# Patient Record
Sex: FEMALE | Race: BLACK OR AFRICAN AMERICAN | NOT HISPANIC OR LATINO | Employment: FULL TIME | URBAN - METROPOLITAN AREA
[De-identification: names, ages, dates, MRNs, and addresses within clinical notes are randomized per-mention and may not be internally consistent; named-entity substitution may affect disease eponyms.]

---

## 2020-12-09 ENCOUNTER — TRANSFERRED RECORDS (OUTPATIENT)
Dept: MULTI SPECIALTY CLINIC | Facility: CLINIC | Age: 45
End: 2020-12-09

## 2020-12-09 LAB
HPV ABSTRACT: NORMAL
PAP-ABSTRACT: ABNORMAL

## 2022-02-04 ENCOUNTER — OFFICE VISIT (OUTPATIENT)
Dept: FAMILY MEDICINE | Facility: CLINIC | Age: 47
End: 2022-02-04
Payer: COMMERCIAL

## 2022-02-04 VITALS
WEIGHT: 258.8 LBS | OXYGEN SATURATION: 96 % | SYSTOLIC BLOOD PRESSURE: 112 MMHG | DIASTOLIC BLOOD PRESSURE: 70 MMHG | BODY MASS INDEX: 41.59 KG/M2 | HEART RATE: 64 BPM | HEIGHT: 66 IN | TEMPERATURE: 98.3 F

## 2022-02-04 DIAGNOSIS — N93.9 ABNORMAL UTERINE BLEEDING (AUB): ICD-10-CM

## 2022-02-04 DIAGNOSIS — Z12.31 ENCOUNTER FOR SCREENING MAMMOGRAM FOR BREAST CANCER: ICD-10-CM

## 2022-02-04 DIAGNOSIS — J30.9 ALLERGIC RHINITIS, UNSPECIFIED SEASONALITY, UNSPECIFIED TRIGGER: ICD-10-CM

## 2022-02-04 DIAGNOSIS — Z13.21 ENCOUNTER FOR VITAMIN DEFICIENCY SCREENING: ICD-10-CM

## 2022-02-04 DIAGNOSIS — Z71.1 CONCERN ABOUT SKIN DISEASE WITHOUT DIAGNOSIS: ICD-10-CM

## 2022-02-04 DIAGNOSIS — R73.03 PREDIABETES: ICD-10-CM

## 2022-02-04 DIAGNOSIS — E66.01 MORBID OBESITY (H): ICD-10-CM

## 2022-02-04 DIAGNOSIS — Z00.00 ROUTINE GENERAL MEDICAL EXAMINATION AT A HEALTH CARE FACILITY: Primary | ICD-10-CM

## 2022-02-04 LAB
DEPRECATED CALCIDIOL+CALCIFEROL SERPL-MC: 26 UG/L (ref 20–75)
ERYTHROCYTE [DISTWIDTH] IN BLOOD BY AUTOMATED COUNT: 15.1 % (ref 10–15)
HBA1C MFR BLD: 6.7 % (ref 0–5.6)
HCT VFR BLD AUTO: 38.5 % (ref 35–47)
HGB BLD-MCNC: 12.3 G/DL (ref 11.7–15.7)
HOLD SPECIMEN: NORMAL
MCH RBC QN AUTO: 25.5 PG (ref 26.5–33)
MCHC RBC AUTO-ENTMCNC: 31.9 G/DL (ref 31.5–36.5)
MCV RBC AUTO: 80 FL (ref 78–100)
PLATELET # BLD AUTO: 260 10E3/UL (ref 150–450)
RBC # BLD AUTO: 4.83 10E6/UL (ref 3.8–5.2)
WBC # BLD AUTO: 5.9 10E3/UL (ref 4–11)

## 2022-02-04 PROCEDURE — 83550 IRON BINDING TEST: CPT | Performed by: FAMILY MEDICINE

## 2022-02-04 PROCEDURE — 80053 COMPREHEN METABOLIC PANEL: CPT | Performed by: FAMILY MEDICINE

## 2022-02-04 PROCEDURE — 99386 PREV VISIT NEW AGE 40-64: CPT | Performed by: FAMILY MEDICINE

## 2022-02-04 PROCEDURE — 36415 COLL VENOUS BLD VENIPUNCTURE: CPT | Performed by: FAMILY MEDICINE

## 2022-02-04 PROCEDURE — 85027 COMPLETE CBC AUTOMATED: CPT | Performed by: FAMILY MEDICINE

## 2022-02-04 PROCEDURE — 84443 ASSAY THYROID STIM HORMONE: CPT | Performed by: FAMILY MEDICINE

## 2022-02-04 PROCEDURE — 80061 LIPID PANEL: CPT | Performed by: FAMILY MEDICINE

## 2022-02-04 PROCEDURE — 83036 HEMOGLOBIN GLYCOSYLATED A1C: CPT | Performed by: FAMILY MEDICINE

## 2022-02-04 PROCEDURE — 82306 VITAMIN D 25 HYDROXY: CPT | Performed by: FAMILY MEDICINE

## 2022-02-04 PROCEDURE — 82728 ASSAY OF FERRITIN: CPT | Performed by: FAMILY MEDICINE

## 2022-02-04 RX ORDER — FLUTICASONE PROPIONATE 50 MCG
1 SPRAY, SUSPENSION (ML) NASAL DAILY
Qty: 16 G | Refills: 3 | Status: SHIPPED | OUTPATIENT
Start: 2022-02-04 | End: 2022-06-07

## 2022-02-04 SDOH — ECONOMIC STABILITY: FOOD INSECURITY: WITHIN THE PAST 12 MONTHS, THE FOOD YOU BOUGHT JUST DIDN'T LAST AND YOU DIDN'T HAVE MONEY TO GET MORE.: NEVER TRUE

## 2022-02-04 SDOH — ECONOMIC STABILITY: TRANSPORTATION INSECURITY
IN THE PAST 12 MONTHS, HAS THE LACK OF TRANSPORTATION KEPT YOU FROM MEDICAL APPOINTMENTS OR FROM GETTING MEDICATIONS?: NO

## 2022-02-04 SDOH — HEALTH STABILITY: PHYSICAL HEALTH: ON AVERAGE, HOW MANY DAYS PER WEEK DO YOU ENGAGE IN MODERATE TO STRENUOUS EXERCISE (LIKE A BRISK WALK)?: 1 DAY

## 2022-02-04 SDOH — ECONOMIC STABILITY: FOOD INSECURITY: WITHIN THE PAST 12 MONTHS, YOU WORRIED THAT YOUR FOOD WOULD RUN OUT BEFORE YOU GOT MONEY TO BUY MORE.: NEVER TRUE

## 2022-02-04 SDOH — ECONOMIC STABILITY: TRANSPORTATION INSECURITY
IN THE PAST 12 MONTHS, HAS LACK OF TRANSPORTATION KEPT YOU FROM MEETINGS, WORK, OR FROM GETTING THINGS NEEDED FOR DAILY LIVING?: NO

## 2022-02-04 SDOH — HEALTH STABILITY: PHYSICAL HEALTH: ON AVERAGE, HOW MANY MINUTES DO YOU ENGAGE IN EXERCISE AT THIS LEVEL?: 20 MIN

## 2022-02-04 SDOH — ECONOMIC STABILITY: INCOME INSECURITY: IN THE LAST 12 MONTHS, WAS THERE A TIME WHEN YOU WERE NOT ABLE TO PAY THE MORTGAGE OR RENT ON TIME?: NO

## 2022-02-04 SDOH — ECONOMIC STABILITY: INCOME INSECURITY: HOW HARD IS IT FOR YOU TO PAY FOR THE VERY BASICS LIKE FOOD, HOUSING, MEDICAL CARE, AND HEATING?: NOT HARD AT ALL

## 2022-02-04 ASSESSMENT — LIFESTYLE VARIABLES
HOW OFTEN DO YOU HAVE SIX OR MORE DRINKS ON ONE OCCASION: NEVER
HOW OFTEN DO YOU HAVE A DRINK CONTAINING ALCOHOL: NEVER
HOW MANY STANDARD DRINKS CONTAINING ALCOHOL DO YOU HAVE ON A TYPICAL DAY: PATIENT DECLINED

## 2022-02-04 ASSESSMENT — ENCOUNTER SYMPTOMS
DYSURIA: 0
ABDOMINAL PAIN: 0
HEMATOCHEZIA: 0
MYALGIAS: 1
FREQUENCY: 1
FEVER: 0
BREAST MASS: 0
WEAKNESS: 0
HEARTBURN: 0
CONSTIPATION: 0
EYE PAIN: 0
SORE THROAT: 0
JOINT SWELLING: 0
ARTHRALGIAS: 0
DIZZINESS: 1
PARESTHESIAS: 0
COUGH: 0
PALPITATIONS: 0
NERVOUS/ANXIOUS: 0
SHORTNESS OF BREATH: 0
CHILLS: 0
HEMATURIA: 0
HEADACHES: 1
DIARRHEA: 0
NAUSEA: 0

## 2022-02-04 ASSESSMENT — SOCIAL DETERMINANTS OF HEALTH (SDOH)
HOW OFTEN DO YOU GET TOGETHER WITH FRIENDS OR RELATIVES?: MORE THAN THREE TIMES A WEEK
DO YOU BELONG TO ANY CLUBS OR ORGANIZATIONS SUCH AS CHURCH GROUPS UNIONS, FRATERNAL OR ATHLETIC GROUPS, OR SCHOOL GROUPS?: NO
IN A TYPICAL WEEK, HOW MANY TIMES DO YOU TALK ON THE PHONE WITH FAMILY, FRIENDS, OR NEIGHBORS?: MORE THAN THREE TIMES A WEEK
HOW OFTEN DO YOU ATTEND CHURCH OR RELIGIOUS SERVICES?: 1 TO 4 TIMES PER YEAR

## 2022-02-04 ASSESSMENT — MIFFLIN-ST. JEOR: SCORE: 1817.72

## 2022-02-04 NOTE — LETTER
February 9, 2022      Lilia Rosales  [address] [place] [address]        Dear ,    We are writing to inform you of your test results.    Regarding your cholesterol panel, your  LDL (bad) cholesterol is elevated.   Your A1c is also now in diabetes range at 6.7.     I recommend starting some diet and exercise measures to lower your cholesterol:       Diet:         - Decrease the saturated fat in your diet (goal <7%) - limit processed meat such as woody and sausage   - Increase your fiber intake- this means more fruits and vegetables. At least 5 servings or fruits and vegetables per day is best.  (goal 25gm daily)   - snack on nuts (almonds, pecans, walnuts, macadamia nuts) and avoid sodas and other sugary beverages     Exercise: Increase your physical activity if you can to at least 150 minutes/week.     Weight: Losing even 10-15 lbs can make a difference in your bad cholesterol levels         Thyroid is in normal range.     Resulted Orders   Hemoglobin A1c   Result Value Ref Range    Hemoglobin A1C 6.7 (H) 0.0 - 5.6 %      Comment:      Normal <5.7%   Prediabetes 5.7-6.4%    Diabetes 6.5% or higher     Note: Adopted from ADA consensus guidelines.   Comprehensive metabolic panel (BMP + Alb, Alk Phos, ALT, AST, Total. Bili, TP)   Result Value Ref Range    Sodium 137 133 - 144 mmol/L    Potassium 4.4 3.4 - 5.3 mmol/L    Chloride 104 94 - 109 mmol/L    Carbon Dioxide (CO2) 29 20 - 32 mmol/L    Anion Gap 4 3 - 14 mmol/L    Urea Nitrogen 7 7 - 30 mg/dL    Creatinine 0.64 0.52 - 1.04 mg/dL    Calcium 9.0 8.5 - 10.1 mg/dL    Glucose 129 (H) 70 - 99 mg/dL    Alkaline Phosphatase 120 40 - 150 U/L    AST 13 0 - 45 U/L    ALT 22 0 - 50 U/L    Protein Total 8.2 6.8 - 8.8 g/dL    Albumin 3.2 (L) 3.4 - 5.0 g/dL    Bilirubin Total 0.4 0.2 - 1.3 mg/dL    GFR Estimate >90 >60 mL/min/1.73m2      Comment:      Effective December 21, 2021 eGFRcr in adults is calculated using the 2021 CKD-EPI creatinine equation which includes  age and gender (Paula evans al., NEJ, DOI: 10.1056/FGVNxn1059606)   Lipid panel reflex to direct LDL Fasting   Result Value Ref Range    Cholesterol 225 (H) <200 mg/dL    Triglycerides 81 <150 mg/dL    Direct Measure HDL 55 >=50 mg/dL    LDL Cholesterol Calculated 154 (H) <=100 mg/dL    Non HDL Cholesterol 170 (H) <130 mg/dL    Patient Fasting > 8hrs? Yes     Narrative    Cholesterol  Desirable:  <200 mg/dL    Triglycerides  Normal:  Less than 150 mg/dL  Borderline High:  150-199 mg/dL  High:  200-499 mg/dL  Very High:  Greater than or equal to 500 mg/dL    Direct Measure HDL  Female:  Greater than or equal to 50 mg/dL   Male:  Greater than or equal to 40 mg/dL    LDL Cholesterol  Desirable:  <100mg/dL  Above Desirable:  100-129 mg/dL   Borderline High:  130-159 mg/dL   High:  160-189 mg/dL   Very High:  >= 190 mg/dL    Non HDL Cholesterol  Desirable:  130 mg/dL  Above Desirable:  130-159 mg/dL  Borderline High:  160-189 mg/dL  High:  190-219 mg/dL  Very High:  Greater than or equal to 220 mg/dL   TSH with free T4 reflex   Result Value Ref Range    TSH 2.87 0.40 - 4.00 mU/L   Vitamin D Deficiency   Result Value Ref Range    Vitamin D, Total (25-Hydroxy) 26 20 - 75 ug/L    Narrative    Season, race, dietary intake, and treatment affect the concentration of 25-hydroxy-Vitamin D. Values may decrease during winter months and increase during summer months. Values 20-29 ug/L may indicate Vitamin D insufficiency and values <20 ug/L may indicate Vitamin D deficiency.    Vitamin D determination is routinely performed by an immunoassay specific for 25 hydroxyvitamin D3.  If an individual is on vitamin D2(ergocalciferol) supplementation, please specify 25 OH vitamin D2 and D3 level determination by LCMSMS test VITD23.     CBC with Platelets and Reflex to Iron Studies   Result Value Ref Range    WBC Count 5.9 4.0 - 11.0 10e3/uL    RBC Count 4.83 3.80 - 5.20 10e6/uL    Hemoglobin 12.3 11.7 - 15.7 g/dL    Hematocrit 38.5 35.0 -  47.0 %    MCV 80 78 - 100 fL    MCH 25.5 (L) 26.5 - 33.0 pg    MCHC 31.9 31.5 - 36.5 g/dL    RDW 15.1 (H) 10.0 - 15.0 %    Platelet Count 260 150 - 450 10e3/uL   Extra Green Top (Lithium Heparin) Tube   Result Value Ref Range    Hold Specimen [facility]    Iron & Iron Binding Capacity   Result Value Ref Range    Iron 36 35 - 180 ug/dL    Iron Binding Capacity 356 240 - 430 ug/dL    Iron Sat Index 10 (L) 15 - 46 %   Ferritin   Result Value Ref Range    Ferritin 18 8 - 252 ng/mL       If you have any questions or concerns, please call the clinic at the number listed above.       Sincerely,      Gayle Espinoza MD/eq

## 2022-02-04 NOTE — PROGRESS NOTES
SUBJECTIVE:   CC: Lilia Rosales is an 46 year old woman who presents for preventive health visit.       Patient has been advised of split billing requirements and indicates understanding: Yes  Healthy Habits:     Getting at least 3 servings of Calcium per day:  Yes    Bi-annual eye exam:  NO    Dental care twice a year:  NO    Sleep apnea or symptoms of sleep apnea:  Excessive snoring    Diet:  Other    Frequency of exercise:  6-7 days/week    Duration of exercise:  15-30 minutes    Taking medications regularly:  Yes    Medication side effects:  None    PHQ-2 Total Score: 0    Additional concerns today:  Yes      Patient recently moved to the area from [place].   Pap- 12/2020. Mild atypical cell , HPV negative.  Repeat in 3 years.   Currently doing a smoothie diet since 1/25 and has lost 5 lbs.     Hx AUB- LMP- 2/1/22. Usually lasts 5-6 days . Hx of endometrial biopsy.           Today's PHQ-2 Score:   PHQ-2 ( 1999 Pfizer) 2/4/2022   Q1: Little interest or pleasure in doing things 0   Q2: Feeling down, depressed or hopeless 0   PHQ-2 Score 0   Q1: Little interest or pleasure in doing things Not at all   Q2: Feeling down, depressed or hopeless Not at all   PHQ-2 Score 0       Abuse: Current or Past (Physical, Sexual or Emotional) - No  Do you feel safe in your environment? Yes        Social History     Tobacco Use     Smoking status: Never Smoker     Smokeless tobacco: Never Used   Substance Use Topics     Alcohol use: Never         Alcohol Use 2/4/2022   Prescreen: >3 drinks/day or >7 drinks/week? Not Applicable       Reviewed orders with patient.  Reviewed health maintenance and updated orders accordingly - Yes  Labs reviewed in EPIC    Breast Cancer Screening:    Breast CA Risk Assessment (FHS-7) 2/4/2022   Do you have a family history of breast, colon, or ovarian cancer? No / Unknown       click delete button to remove this line now  Mammogram Screening: Recommended annual mammography  Pertinent mammograms are  "reviewed under the imaging tab.    History of abnormal Pap smear: NO - age 30- 65 PAP every 3 years recommended     Reviewed and updated as needed this visit by clinical staff  Tobacco  Allergies  Meds     Fam Hx  Soc Hx       Reviewed and updated as needed this visit by Provider                   Review of Systems   Constitutional: Negative for chills and fever.   HENT: Positive for congestion and ear pain. Negative for hearing loss and sore throat.    Eyes: Negative for pain and visual disturbance.   Respiratory: Negative for cough and shortness of breath.    Cardiovascular: Positive for peripheral edema. Negative for chest pain and palpitations.   Gastrointestinal: Negative for abdominal pain, constipation, diarrhea, heartburn, hematochezia and nausea.   Breasts:  Negative for tenderness, breast mass and discharge.   Genitourinary: Positive for frequency and urgency. Negative for dysuria, genital sores, hematuria, pelvic pain, vaginal bleeding and vaginal discharge.   Musculoskeletal: Positive for myalgias. Negative for arthralgias and joint swelling.   Skin: Negative for rash.   Neurological: Positive for dizziness and headaches. Negative for weakness and paresthesias.   Psychiatric/Behavioral: Negative for mood changes. The patient is not nervous/anxious.           OBJECTIVE:   /70 (BP Location: Right arm, Patient Position: Sitting, Cuff Size: Adult Regular)   Pulse 64   Temp 98.3  F (36.8  C) (Oral)   Ht 1.664 m (5' 5.5\")   Wt 117.4 kg (258 lb 12.8 oz)   LMP 02/01/2022 (Exact Date)   SpO2 96%   BMI 42.41 kg/m    Physical Exam  GENERAL: healthy, alert and no distress  EYES: Eyes grossly normal to inspection, PERRL and conjunctivae and sclerae normal  HENT: ear canals and TM's normal, nose and mouth without ulcers or lesions  NECK: no adenopathy, no asymmetry, masses, or scars and thyroid normal to palpation  RESP: lungs clear to auscultation - no rales, rhonchi or wheezes  CV: regular rate and " rhythm, normal S1 S2, no S3 or S4, no murmur, click or rub, no peripheral edema and peripheral pulses strong  ABDOMEN: soft, nontender,  and bowel sounds normal  MS: no gross musculoskeletal defects noted, no edema  SKIN: no suspicious lesions or rashes  NEURO: Normal strength and tone, mentation intact and speech normal  PSYCH: mentation appears normal, affect normal/bright    Diagnostic Test Results:  Labs reviewed in Epic  none     ASSESSMENT/PLAN:   (Z00.00) Routine general medical examination at a health care facility  (primary encounter diagnosis)  Plan: Comprehensive metabolic panel (BMP + Alb, Alk         Phos, ALT, AST, Total. Bili, TP), Lipid panel         reflex to direct LDL Fasting, TSH with free T4         reflex, CBC with Platelets and Reflex to Iron         Studies, Iron & Iron Binding Capacity,         Ferritin, CANCELED: CBC with platelets and         differential      (E66.01) Morbid obesity (H)  Comment: educated on avoiding quick diets that are not sustainable. Proper educated on how to eat well.       (R73.03) Prediabetes  Plan: Hemoglobin A1c      (Z13.21) Encounter for vitamin deficiency screening  Plan: Vitamin D Deficiency      (Z12.31) Encounter for screening mammogram for breast cancer  Plan: *MA Screening Digital Bilateral      (N93.9) Abnormal uterine bleeding (AUB)  Comment: recently had IUD removed because it was noted to be falling out. Will refer to GYN.   Plan: Ob/Gyn Referral      (J30.9) Allergic rhinitis, unspecified seasonality, unspecified trigger  Comment: by history. Refill flonase   Plan: fluticasone (FLONASE) 50 MCG/ACT nasal spray      (Z71.1) Concern about skin disease without diagnosis  Comment: per patient referral.   Plan: Adult Dermatology Referral          Patient has been advised of split billing requirements and indicates understanding: Yes    COUNSELING:  Reviewed preventive health counseling, as reflected in patient instructions       Regular exercise        "Healthy diet/nutrition    Estimated body mass index is 42.41 kg/m  as calculated from the following:    Height as of this encounter: 1.664 m (5' 5.5\").    Weight as of this encounter: 117.4 kg (258 lb 12.8 oz).    Weight management plan: Discussed healthy diet and exercise guidelines    She reports that she has never smoked. She has never used smokeless tobacco.      Counseling Resources:  ATP IV Guidelines  Pooled Cohorts Equation Calculator  Breast Cancer Risk Calculator  BRCA-Related Cancer Risk Assessment: FHS-7 Tool  FRAX Risk Assessment  ICSI Preventive Guidelines  Dietary Guidelines for Americans, 2010  USDA's MyPlate  ASA Prophylaxis  Lung CA Screening    Gayle Espinoza MD  [facility] [place]  "

## 2022-02-04 NOTE — PATIENT INSTRUCTIONS
http://www.checkyourhealth.org/physical-activity/wow.php      Preventive Health Recommendations  Female Ages 40 to 49    Yearly exam:     See your health care provider every year in order to  1. Review health changes.   2. Discuss preventive care.    3. Review your medicines if your doctor prescribed any.      Get a Pap test every three years (unless you have an abnormal result and your provider advises testing more often).      If you get Pap tests with HPV test, you only need to test every 5 years, unless you have an abnormal result. You do not need a Pap test if your uterus was removed (hysterectomy) and you have not had cancer.      You should be tested each year for STDs (sexually transmitted diseases), if you're at risk.     Ask your doctor if you should have a mammogram.      Have a colonoscopy (test for colon cancer) if someone in your family has had colon cancer or polyps before age 50.       Have a cholesterol test every 5 years.       Have a diabetes test (fasting glucose) after age 45. If you are at risk for diabetes, you should have this test every 3 years.    Shots: Get a flu shot each year. Get a tetanus shot every 10 years.     Nutrition:     Eat at least 5 servings of fruits and vegetables each day.    Eat whole-grain bread, whole-wheat pasta and brown rice instead of white grains and rice.    Get adequate Calcium and Vitamin D.      Lifestyle    Exercise at least 150 minutes a week (an average of 30 minutes a day, 5 days a week). This will help you control your weight and prevent disease.    Limit alcohol to one drink per day.    No smoking.     Wear sunscreen to prevent skin cancer.    See your dentist every six months for an exam and cleaning.

## 2022-02-05 LAB
ALBUMIN SERPL-MCNC: 3.2 G/DL (ref 3.4–5)
ALP SERPL-CCNC: 120 U/L (ref 40–150)
ALT SERPL W P-5'-P-CCNC: 22 U/L (ref 0–50)
ANION GAP SERPL CALCULATED.3IONS-SCNC: 4 MMOL/L (ref 3–14)
AST SERPL W P-5'-P-CCNC: 13 U/L (ref 0–45)
BILIRUB SERPL-MCNC: 0.4 MG/DL (ref 0.2–1.3)
BUN SERPL-MCNC: 7 MG/DL (ref 7–30)
CALCIUM SERPL-MCNC: 9 MG/DL (ref 8.5–10.1)
CHLORIDE BLD-SCNC: 104 MMOL/L (ref 94–109)
CHOLEST SERPL-MCNC: 225 MG/DL
CO2 SERPL-SCNC: 29 MMOL/L (ref 20–32)
CREAT SERPL-MCNC: 0.64 MG/DL (ref 0.52–1.04)
FASTING STATUS PATIENT QL REPORTED: YES
FERRITIN SERPL-MCNC: 18 NG/ML (ref 8–252)
GFR SERPL CREATININE-BSD FRML MDRD: >90 ML/MIN/1.73M2
GLUCOSE BLD-MCNC: 129 MG/DL (ref 70–99)
HDLC SERPL-MCNC: 55 MG/DL
IRON SATN MFR SERPL: 10 % (ref 15–46)
IRON SERPL-MCNC: 36 UG/DL (ref 35–180)
LDLC SERPL CALC-MCNC: 154 MG/DL
NONHDLC SERPL-MCNC: 170 MG/DL
POTASSIUM BLD-SCNC: 4.4 MMOL/L (ref 3.4–5.3)
PROT SERPL-MCNC: 8.2 G/DL (ref 6.8–8.8)
SODIUM SERPL-SCNC: 137 MMOL/L (ref 133–144)
TIBC SERPL-MCNC: 356 UG/DL (ref 240–430)
TRIGL SERPL-MCNC: 81 MG/DL
TSH SERPL DL<=0.005 MIU/L-ACNC: 2.87 MU/L (ref 0.4–4)

## 2022-02-08 DIAGNOSIS — D50.0 IRON DEFICIENCY ANEMIA DUE TO CHRONIC BLOOD LOSS: Primary | ICD-10-CM

## 2022-02-08 RX ORDER — FERROUS SULFATE 325(65) MG
325 TABLET ORAL
Qty: 90 TABLET | Refills: 1 | Status: SHIPPED | OUTPATIENT
Start: 2022-02-08 | End: 2022-06-07

## 2022-02-09 NOTE — RESULT ENCOUNTER NOTE
Please send patient a letter to let them know results are normal.     Regarding your cholesterol panel, your  LDL (bad) cholesterol is elevated.   Your A1c is also now in diabetes range at 6.7.    I recommend starting some diet and exercise measures to lower your cholesterol:      Diet:        - Decrease the saturated fat in your diet (goal <7%) - limit processed meat such as woody and sausage  - Increase your fiber intake- this means more fruits and vegetables. At least 5 servings or fruits and vegetables per day is best.  (goal 25gm daily)   - snack on nuts (almonds, pecans, walnuts, macadamia nuts) and avoid sodas and other sugary beverages    Exercise: Increase your physical activity if you can to at least 150 minutes/week.     Weight: Losing even 10-15 lbs can make a difference in your bad cholesterol levels      Thyroid is in normal range.     For further questions or concerns please let us know.

## 2022-02-22 ENCOUNTER — HOSPITAL ENCOUNTER (OUTPATIENT)
Dept: MAMMOGRAPHY | Facility: CLINIC | Age: 47
Discharge: HOME | End: 2022-02-22
Admitting: FAMILY MEDICINE
Payer: COMMERCIAL

## 2022-02-22 DIAGNOSIS — Z12.31 ENCOUNTER FOR SCREENING MAMMOGRAM FOR BREAST CANCER: ICD-10-CM

## 2022-02-22 PROCEDURE — 77067 SCR MAMMO BI INCL CAD: CPT

## 2022-03-10 ENCOUNTER — HOSPITAL ENCOUNTER (OUTPATIENT)
Dept: ULTRASOUND IMAGING | Facility: CLINIC | Age: 47
Discharge: HOME | End: 2022-03-10
Attending: FAMILY MEDICINE | Admitting: FAMILY MEDICINE
Payer: COMMERCIAL

## 2022-03-10 DIAGNOSIS — R92.8 ABNORMAL MAMMOGRAM: ICD-10-CM

## 2022-03-10 PROCEDURE — 76642 ULTRASOUND BREAST LIMITED: CPT | Mod: RT

## 2022-05-06 ENCOUNTER — HOSPITAL ENCOUNTER (OUTPATIENT)
Facility: CLINIC | Age: 47
Discharge: HOME | End: 2022-05-06
Attending: COLON & RECTAL SURGERY | Admitting: COLON & RECTAL SURGERY
Payer: COMMERCIAL

## 2022-05-06 VITALS
DIASTOLIC BLOOD PRESSURE: 74 MMHG | WEIGHT: 240 LBS | SYSTOLIC BLOOD PRESSURE: 134 MMHG | OXYGEN SATURATION: 99 % | BODY MASS INDEX: 34.36 KG/M2 | HEART RATE: 61 BPM | RESPIRATION RATE: 14 BRPM | HEIGHT: 70 IN

## 2022-05-06 LAB
COLONOSCOPY: NORMAL
SARS-COV-2 RNA RESP QL NAA+PROBE: NEGATIVE

## 2022-05-06 PROCEDURE — U0005 INFEC AGEN DETEC AMPLI PROBE: HCPCS | Performed by: COLON & RECTAL SURGERY

## 2022-05-06 PROCEDURE — 88305 TISSUE EXAM BY PATHOLOGIST: CPT | Mod: 26 | Performed by: PATHOLOGY

## 2022-05-06 PROCEDURE — 88305 TISSUE EXAM BY PATHOLOGIST: CPT | Mod: TC | Performed by: COLON & RECTAL SURGERY

## 2022-05-06 PROCEDURE — 250N000011 HC RX IP 250 OP 636: Performed by: COLON & RECTAL SURGERY

## 2022-05-06 PROCEDURE — G0500 MOD SEDAT ENDO SERVICE >5YRS: HCPCS | Performed by: COLON & RECTAL SURGERY

## 2022-05-06 PROCEDURE — 45380 COLONOSCOPY AND BIOPSY: CPT | Mod: PT | Performed by: COLON & RECTAL SURGERY

## 2022-05-06 RX ORDER — NALOXONE HYDROCHLORIDE 0.4 MG/ML
0.2 INJECTION, SOLUTION INTRAMUSCULAR; INTRAVENOUS; SUBCUTANEOUS
Status: CANCELLED | OUTPATIENT
Start: 2022-05-06

## 2022-05-06 RX ORDER — NALOXONE HYDROCHLORIDE 0.4 MG/ML
0.4 INJECTION, SOLUTION INTRAMUSCULAR; INTRAVENOUS; SUBCUTANEOUS
Status: CANCELLED | OUTPATIENT
Start: 2022-05-06

## 2022-05-06 RX ORDER — ONDANSETRON 4 MG/1
4 TABLET, ORALLY DISINTEGRATING ORAL EVERY 6 HOURS PRN
Status: CANCELLED | OUTPATIENT
Start: 2022-05-06

## 2022-05-06 RX ORDER — FLUMAZENIL 0.1 MG/ML
0.2 INJECTION, SOLUTION INTRAVENOUS
Status: CANCELLED | OUTPATIENT
Start: 2022-05-06 | End: 2022-05-07

## 2022-05-06 RX ORDER — ONDANSETRON 2 MG/ML
4 INJECTION INTRAMUSCULAR; INTRAVENOUS
Status: DISCONTINUED | OUTPATIENT
Start: 2022-05-06 | End: 2022-05-06 | Stop reason: HOSPADM

## 2022-05-06 RX ORDER — NALOXONE HYDROCHLORIDE 0.4 MG/ML
0.4 INJECTION, SOLUTION INTRAMUSCULAR; INTRAVENOUS; SUBCUTANEOUS
Status: DISCONTINUED | OUTPATIENT
Start: 2022-05-06 | End: 2022-05-06 | Stop reason: HOSPADM

## 2022-05-06 RX ORDER — PROCHLORPERAZINE MALEATE 10 MG
10 TABLET ORAL EVERY 6 HOURS PRN
Status: CANCELLED | OUTPATIENT
Start: 2022-05-06

## 2022-05-06 RX ORDER — NORETHINDRONE ACETATE 5 MG
TABLET ORAL
COMMUNITY
Start: 2022-05-02 | End: 2024-05-17

## 2022-05-06 RX ORDER — LEVOTHYROXINE SODIUM 50 UG/1
50 TABLET ORAL
COMMUNITY
Start: 2020-08-25 | End: 2022-06-07

## 2022-05-06 RX ORDER — ONDANSETRON 2 MG/ML
4 INJECTION INTRAMUSCULAR; INTRAVENOUS EVERY 6 HOURS PRN
Status: CANCELLED | OUTPATIENT
Start: 2022-05-06

## 2022-05-06 RX ORDER — LIDOCAINE 40 MG/G
CREAM TOPICAL
Status: DISCONTINUED | OUTPATIENT
Start: 2022-05-06 | End: 2022-05-06 | Stop reason: HOSPADM

## 2022-05-06 RX ORDER — ONDANSETRON 4 MG/1
4 TABLET, ORALLY DISINTEGRATING ORAL EVERY 6 HOURS PRN
Status: DISCONTINUED | OUTPATIENT
Start: 2022-05-06 | End: 2022-05-06 | Stop reason: HOSPADM

## 2022-05-06 RX ORDER — NALOXONE HYDROCHLORIDE 0.4 MG/ML
0.2 INJECTION, SOLUTION INTRAMUSCULAR; INTRAVENOUS; SUBCUTANEOUS
Status: DISCONTINUED | OUTPATIENT
Start: 2022-05-06 | End: 2022-05-06 | Stop reason: HOSPADM

## 2022-05-06 RX ORDER — CETIRIZINE HYDROCHLORIDE 10 MG/1
10 TABLET ORAL
COMMUNITY
Start: 2020-08-06 | End: 2022-06-07

## 2022-05-06 RX ORDER — FLUMAZENIL 0.1 MG/ML
0.2 INJECTION, SOLUTION INTRAVENOUS
Status: DISCONTINUED | OUTPATIENT
Start: 2022-05-06 | End: 2022-05-06 | Stop reason: HOSPADM

## 2022-05-06 RX ORDER — ONDANSETRON 4 MG/1
TABLET, ORALLY DISINTEGRATING ORAL
COMMUNITY
Start: 2022-03-15 | End: 2022-06-07

## 2022-05-06 RX ORDER — FENTANYL CITRATE 50 UG/ML
INJECTION, SOLUTION INTRAMUSCULAR; INTRAVENOUS PRN
Status: COMPLETED | OUTPATIENT
Start: 2022-05-06 | End: 2022-05-06

## 2022-05-06 RX ORDER — PROCHLORPERAZINE MALEATE 10 MG
10 TABLET ORAL EVERY 6 HOURS PRN
Status: DISCONTINUED | OUTPATIENT
Start: 2022-05-06 | End: 2022-05-06 | Stop reason: HOSPADM

## 2022-05-06 RX ORDER — CYCLOBENZAPRINE HCL 10 MG
10 TABLET ORAL
COMMUNITY
Start: 2020-10-27 | End: 2022-06-07

## 2022-05-06 RX ORDER — ONDANSETRON 2 MG/ML
4 INJECTION INTRAMUSCULAR; INTRAVENOUS EVERY 6 HOURS PRN
Status: DISCONTINUED | OUTPATIENT
Start: 2022-05-06 | End: 2022-05-06 | Stop reason: HOSPADM

## 2022-05-06 RX ADMIN — FENTANYL CITRATE 100 MCG: 50 INJECTION, SOLUTION INTRAMUSCULAR; INTRAVENOUS at 15:31

## 2022-05-06 RX ADMIN — MIDAZOLAM 2 MG: 1 INJECTION INTRAMUSCULAR; INTRAVENOUS at 15:31

## 2022-05-06 NOTE — H&P
Pre-Endoscopy History and Physical     Lilia Rosales MRN# [id number]   YOB: 1975 Age: 47 year old     Date of Procedure: 5/6/2022  Primary care provider: Gayle Espinoza  Type of Endoscopy: Colonoscopy  Reason for Procedure: Screening  Type of Anesthesia Anticipated: Moderate Sedation    HPI:    Lilia is a 47 year old female who will be undergoing the above procedure.      A history and physical has been performed. The patient's medications and allergies have been reviewed. The risks and benefits of the procedure and the sedation options and risks were discussed with the patient.  All questions were answered and informed consent was obtained.      She denies a personal or family history of anesthesia complications or bleeding disorders.     No Known Allergies     No current facility-administered medications on file prior to encounter.  cetirizine (ZYRTEC) 10 MG tablet, Take 10 mg by mouth  cyclobenzaprine (FLEXERIL) 10 MG tablet, Take 10 mg by mouth  levothyroxine (SYNTHROID/LEVOTHROID) 50 MCG tablet, Take 50 mcg by mouth  metFORMIN (GLUCOPHAGE) 500 MG tablet, Take 500 mg by mouth  vitamin D3 (CHOLECALCIFEROL) 1.25 MG (90545 UT) capsule, Take 50,000 Units by mouth  ferrous sulfate (FEROSUL) 325 (65 Fe) MG tablet, Take 1 tablet (325 mg) by mouth daily (with breakfast)  fluticasone (FLONASE) 50 MCG/ACT nasal spray, Spray 1 spray into both nostrils daily  norethindrone (AYGESTIN) 5 MG tablet,   ondansetron (ZOFRAN ODT) 4 MG ODT tab,         Patient Active Problem List   Diagnosis     Morbid obesity (H)     Prediabetes        Past Medical History:   Diagnosis Date     Thyroid disease         Past Surgical History:   Procedure Laterality Date     BREAST SURGERY Left 08/20/2014       Social History     Tobacco Use     Smoking status: Never Smoker     Smokeless tobacco: Never Used   Substance Use Topics     Alcohol use: Never       Family History   Problem Relation Age of Onset     Diabetes  "Mother      Hypertension Mother      Diabetes Father      Hypertension Father        REVIEW OF SYSTEMS:     5 point ROS negative except as noted above in HPI, including Gen., Resp., CV, GI &  system review.      PHYSICAL EXAM:   /66   Ht 1.778 m (5' 10\")   Wt 108.9 kg (240 lb)   SpO2 100%   BMI 34.44 kg/m   Estimated body mass index is 34.44 kg/m  as calculated from the following:    Height as of this encounter: 1.778 m (5' 10\").    Weight as of this encounter: 108.9 kg (240 lb).   GENERAL APPEARANCE: healthy and alert  MENTAL STATUS: alert  AIRWAY EXAM: Mallampatti Class I (visualization of the soft palate, fauces, uvula, anterior and posterior pillars)  RESP: lungs clear to auscultation - no rales, rhonchi or wheezes  CV: regular rates and rhythm      IMPRESSION   ASA Class 2 - Mild systemic disease        PLAN:     Plan for colonoscopy. We discussed the risks, benefits and alternatives and the patient wished to proceed.    The above has been forwarded to the consulting provider.      Irma Norris MD  [facility]  Phone: [phone]  Fax: [phone]  May 6, 2022    "

## 2022-05-10 LAB
PATH REPORT.COMMENTS IMP SPEC: NORMAL
PATH REPORT.COMMENTS IMP SPEC: NORMAL
PATH REPORT.FINAL DX SPEC: NORMAL
PATH REPORT.GROSS SPEC: NORMAL
PATH REPORT.MICROSCOPIC SPEC OTHER STN: NORMAL
PATH REPORT.RELEVANT HX SPEC: NORMAL
PHOTO IMAGE: NORMAL

## 2022-06-07 ENCOUNTER — OFFICE VISIT (OUTPATIENT)
Dept: FAMILY MEDICINE | Facility: CLINIC | Age: 47
End: 2022-06-07
Payer: COMMERCIAL

## 2022-06-07 VITALS
BODY MASS INDEX: 34.67 KG/M2 | WEIGHT: 241.6 LBS | RESPIRATION RATE: 20 BRPM | TEMPERATURE: 98 F | SYSTOLIC BLOOD PRESSURE: 109 MMHG | OXYGEN SATURATION: 98 % | DIASTOLIC BLOOD PRESSURE: 73 MMHG | HEART RATE: 79 BPM

## 2022-06-07 DIAGNOSIS — Z01.818 PREOP GENERAL PHYSICAL EXAM: Primary | ICD-10-CM

## 2022-06-07 DIAGNOSIS — Z11.59 NEED FOR HEPATITIS C SCREENING TEST: ICD-10-CM

## 2022-06-07 DIAGNOSIS — Z11.4 SCREENING FOR HIV (HUMAN IMMUNODEFICIENCY VIRUS): ICD-10-CM

## 2022-06-07 PROBLEM — G43.709 CHRONIC MIGRAINE WITHOUT AURA WITHOUT STATUS MIGRAINOSUS, NOT INTRACTABLE: Status: ACTIVE | Noted: 2018-08-03

## 2022-06-07 PROBLEM — N97.0 FEMALE INFERTILITY ASSOCIATED WITH ANOVULATION: Status: ACTIVE | Noted: 2022-06-07

## 2022-06-07 LAB
ALBUMIN UR-MCNC: NEGATIVE MG/DL
APPEARANCE UR: CLEAR
BACTERIA #/AREA URNS HPF: ABNORMAL /HPF
BILIRUB UR QL STRIP: NEGATIVE
COLOR UR AUTO: YELLOW
FASTING STATUS PATIENT QL REPORTED: ABNORMAL
GLUCOSE BLD-MCNC: 162 MG/DL (ref 70–99)
GLUCOSE UR STRIP-MCNC: NEGATIVE MG/DL
HBA1C MFR BLD: 7 % (ref 0–5.6)
HGB BLD-MCNC: 12.3 G/DL (ref 11.7–15.7)
HGB UR QL STRIP: ABNORMAL
KETONES UR STRIP-MCNC: NEGATIVE MG/DL
LEUKOCYTE ESTERASE UR QL STRIP: NEGATIVE
NITRATE UR QL: NEGATIVE
PH UR STRIP: 5.5 [PH] (ref 5–7)
RBC #/AREA URNS AUTO: ABNORMAL /HPF
SP GR UR STRIP: 1.02 (ref 1–1.03)
SQUAMOUS #/AREA URNS AUTO: ABNORMAL /LPF
UROBILINOGEN UR STRIP-ACNC: 0.2 E.U./DL
WBC #/AREA URNS AUTO: ABNORMAL /HPF

## 2022-06-07 PROCEDURE — 36415 COLL VENOUS BLD VENIPUNCTURE: CPT | Performed by: FAMILY MEDICINE

## 2022-06-07 PROCEDURE — 81001 URINALYSIS AUTO W/SCOPE: CPT | Performed by: FAMILY MEDICINE

## 2022-06-07 PROCEDURE — 83036 HEMOGLOBIN GLYCOSYLATED A1C: CPT | Performed by: FAMILY MEDICINE

## 2022-06-07 PROCEDURE — 87389 HIV-1 AG W/HIV-1&-2 AB AG IA: CPT | Performed by: FAMILY MEDICINE

## 2022-06-07 PROCEDURE — 99214 OFFICE O/P EST MOD 30 MIN: CPT | Mod: 25 | Performed by: FAMILY MEDICINE

## 2022-06-07 PROCEDURE — 85018 HEMOGLOBIN: CPT | Performed by: FAMILY MEDICINE

## 2022-06-07 PROCEDURE — 86803 HEPATITIS C AB TEST: CPT | Performed by: FAMILY MEDICINE

## 2022-06-07 PROCEDURE — 90471 IMMUNIZATION ADMIN: CPT | Performed by: FAMILY MEDICINE

## 2022-06-07 PROCEDURE — 82947 ASSAY GLUCOSE BLOOD QUANT: CPT | Performed by: FAMILY MEDICINE

## 2022-06-07 PROCEDURE — 90715 TDAP VACCINE 7 YRS/> IM: CPT | Performed by: FAMILY MEDICINE

## 2022-06-07 NOTE — PATIENT INSTRUCTIONS
Preparing for Your Surgery  Getting started  A nurse will call you to review your health history and instructions. They will give you an arrival time based on your scheduled surgery time. Please be ready to share:    Your doctor's clinic name and phone number    Your medical, surgical and anesthesia history    A list of allergies and sensitivities    A list of medicines, including herbal treatments and over-the-counter drugs    Whether the patient has a legal guardian (ask how to send us the papers in advance)  Please tell us if you're pregnant--or if there's any chance you might be pregnant. Some surgeries may injure a fetus (unborn baby), so they require a pregnancy test. Surgeries that are safe for a fetus don't always need a test, and you can choose whether to have one.   If you have a child who's having surgery, please ask for a copy of Preparing for Your Child's Surgery.    Preparing for surgery    Within 30 days of surgery: Have a pre-op exam (sometimes called an H&P, or History and Physical). This can be done at a clinic or pre-operative center.  ? If you're having a , you may not need this exam. Talk to your care team.    At your pre-op exam, talk to your care team about all medicines you take. If you need to stop any medicines before surgery, ask when to start taking them again.  ? We do this for your safety. Many medicines can make you bleed too much during surgery. Some change how well surgery (anesthesia) drugs work.    Call your insurance company to let them know you're having surgery. (If you don't have insurance, call [phone].)    Call your clinic if there's any change in your health. This includes signs of a cold or flu (sore throat, runny nose, cough, rash, fever). It also includes a scrape or scratch near the surgery site.    If you have questions on the day of surgery, call your hospital or surgery center.  COVID testing  You may need to be tested for COVID-19 before having  surgery. If so, we will give you instructions.  Eating and drinking guidelines  For your safety: Unless your surgeon tells you otherwise, follow the guidelines below.    Eat and drink as usual until 8 hours before surgery. After that, no food or milk.    Drink clear liquids until 2 hours before surgery. These are liquids you can see through, like water, Gatorade and Propel Water. You may also have black coffee and tea (no cream or milk).    Nothing by mouth within 2 hours of surgery. This includes gum, candy and breath mints.    If you drink alcohol: Stop drinking it the night before surgery.    If your care team tells you to take medicine on the morning of surgery, it's okay to take it with a sip of water.  Preventing infection    Shower or bathe the night before and morning of your surgery. Follow the instructions your clinic gave you. (If no instructions, use regular soap.)    Don't shave or clip hair near your surgery site. We'll remove the hair if needed.    Don't smoke or vape the morning of surgery. You may chew nicotine gum up to 2 hours before surgery. A nicotine patch is okay.  ? Note: Some surgeries require you to completely quit smoking and nicotine. Check with your surgeon.    Your care team will make every effort to keep you safe from infection. We will:  ? Clean our hands often with soap and water (or an alcohol-based hand rub).  ? Clean the skin at your surgery site with a special soap that kills germs.  ? Give you a special gown to keep you warm. (Cold raises the risk of infection.)  ? Wear special hair covers, masks, gowns and gloves during surgery.  ? Give antibiotic medicine, if prescribed. Not all surgeries need antibiotics.  What to bring on the day of surgery    Photo ID and insurance card    Copy of your health care directive, if you have one    Glasses and hearing aides (bring cases)  ? You can't wear contacts during surgery    Inhaler and eye drops, if you use them (tell us about these when  you arrive)    CPAP machine or breathing device, if you use them    A few personal items, if spending the night    If you have . . .  ? A pacemaker, ICD (cardiac defibrillator) or other implant: Bring the ID card.  ? An implanted stimulator: Bring the remote control.  ? A legal guardian: Bring a copy of the certified (court-stamped) guardianship papers.  Please remove any jewelry, including body piercings. Leave jewelry and other valuables at home.  If you're going home the day of surgery    You must have a responsible adult drive you home. They should stay with you overnight as well.    If you don't have someone to stay with you, and you aren't safe to go home alone, we may keep you overnight. Insurance often won't pay for this.  After surgery  If it's hard to control your pain or you need more pain medicine, please call your surgeon's office.  Questions?   If you have any questions for your care team, list them here: _________________________________________________________________________________________________________________________________________________________________________ ____________________________________ ____________________________________ ____________________________________  For informational purposes only. Not to replace the advice of your health care provider. Copyright   2003, 2019 [facility]. All rights reserved. Clinically reviewed by Radha Jane MD. [email or web address] [id number] - REV 07/21.

## 2022-06-07 NOTE — PROGRESS NOTES
[facility]  [id number] [place] [id number]  Phone: [phone]  Primary Provider: Gayle Espinoza  Pre-op Performing Provider: JANICE LYN      PREOPERATIVE EVALUATION:  Today's date: 6/7/2022    Lilia Rosales is a 47 year old female who presents for a preoperative evaluation.    Surgical Information:  Surgery/Procedure: Hysteroscopy - D&C  Surgery Location: Health system  Surgeon: Dr. Barnes  Surgery Date: 6/16/22  Time of Surgery: 8:45  Where patient plans to recover: At home with family  Fax number for surgical facility: Note does not need to be faxed, will be available electronically in Epic.    Type of Anesthesia Anticipated: General    Assessment & Plan     The proposed surgical procedure is considered LOW risk.    Screening for HIV (human immunodeficiency virus)    - HIV Antigen Antibody Combo; Future  - HIV Antigen Antibody Combo    Need for hepatitis C screening test    - Hepatitis C Screen Reflex to HCV RNA Quant and Genotype; Future  - Hepatitis C Screen Reflex to HCV RNA Quant and Genotype    Preop general physical exam  Check below labs, pt has not been successful in cutting down weight or exercising.  - Glucose; Future  - Hemoglobin A1c; Future  - Hemoglobin; Future  - UA Macro with Reflex to Micro and Culture - lab collect; Future  - Glucose  - Hemoglobin A1c  - Hemoglobin  - UA Macro with Reflex to Micro and Culture - lab collect         Risks and Recommendations:  The patient has the following additional risks and recommendations for perioperative complications:  Diabetes:  - Patient is not on insulin therapy: regular NPO guidelines can be followed.     Medication Instructions:  Patient is to take all scheduled medications on the day of surgery    RECOMMENDATION:  APPROVAL GIVEN to proceed with proposed procedure, without further diagnostic evaluation.                      Subjective     HPI related to upcoming procedure: Dilatation and Curettage due  to heavy menstrual bleeding.     Preop Questions 6/7/2022   1. Have you ever had a heart attack or stroke? No   2. Have you ever had surgery on your heart or blood vessels, such as a stent placement, a coronary artery bypass, or surgery on an artery in your head, neck, heart, or legs? No   3. Do you have chest pain with activity? No   4. Do you have a history of  heart failure? No   5. Do you currently have a cold, bronchitis or symptoms of other infection? No   6. Do you have a cough, shortness of breath, or wheezing? No   7. Do you or anyone in your family have previous history of blood clots? No   8. Do you or does anyone in your family have a serious bleeding problem such as prolonged bleeding following surgeries or cuts? No   9. Have you ever had problems with anemia or been told to take iron pills? YES - in the past.   10. Have you had any abnormal blood loss such as black, tarry or bloody stools, or abnormal vaginal bleeding? No   11. Have you ever had a blood transfusion? No   12. Are you willing to have a blood transfusion if it is medically needed before, during, or after your surgery? Yes   13. Have you or any of your relatives ever had problems with anesthesia? No   14. Do you have sleep apnea, excessive snoring or daytime drowsiness? No   15. Do you have any artifical heart valves or other implanted medical devices like a pacemaker, defibrillator, or continuous glucose monitor? No   16. Do you have artificial joints? No   17. Are you allergic to latex? No   18. Is there any chance that you may be pregnant? No       Health Care Directive:  Patient does not have a Health Care Directive or Living Will:     Preoperative Review of :   reviewed - no record of controlled substances prescribed.      Status of Chronic Conditions:  See problem list for active medical problems.  Problems all longstanding and stable, except as noted/documented.  See ROS for pertinent symptoms related to these  conditions.      Review of Systems  CONSTITUTIONAL: NEGATIVE for fever, chills, change in weight  INTEGUMENTARY/SKIN: NEGATIVE for worrisome rashes, moles or lesions  EYES: NEGATIVE for vision changes or irritation  ENT/MOUTH: NEGATIVE for ear, mouth and throat problems  RESP: NEGATIVE for significant cough or SOB  CV: NEGATIVE for chest pain, palpitations or peripheral edema  GI: NEGATIVE for nausea, abdominal pain, heartburn, or change in bowel habits   female: heavy menstrual bleeding.  MUSCULOSKELETAL: NEGATIVE for significant arthralgias or myalgia  NEURO: NEGATIVE for weakness, dizziness or paresthesias  ENDOCRINE: NEGATIVE for temperature intolerance, skin/hair changes  HEME: NEGATIVE for bleeding problems  PSYCHIATRIC: NEGATIVE for changes in mood or affect    Patient Active Problem List    Diagnosis Date Noted     Morbid obesity (H) 02/04/2022     Priority: Medium     Prediabetes 02/04/2022     Priority: Medium      Past Medical History:   Diagnosis Date     Thyroid disease      Past Surgical History:   Procedure Laterality Date     BREAST SURGERY Left 08/20/2014     COLONOSCOPY N/A 5/6/2022    Procedure: COLONOSCOPY, WITH POLYPECTOMY AND BIOPSY;  Surgeon: Edwige Norris MD;  Location:  GI     Current Outpatient Medications   Medication Sig Dispense Refill     cetirizine (ZYRTEC) 10 MG tablet Take 10 mg by mouth       cyclobenzaprine (FLEXERIL) 10 MG tablet Take 10 mg by mouth       ferrous sulfate (FEROSUL) 325 (65 Fe) MG tablet Take 1 tablet (325 mg) by mouth daily (with breakfast) 90 tablet 1     fluticasone (FLONASE) 50 MCG/ACT nasal spray Spray 1 spray into both nostrils daily 16 g 3     levothyroxine (SYNTHROID/LEVOTHROID) 50 MCG tablet Take 50 mcg by mouth       metFORMIN (GLUCOPHAGE) 500 MG tablet Take 500 mg by mouth       norethindrone (AYGESTIN) 5 MG tablet        ondansetron (ZOFRAN ODT) 4 MG ODT tab        vitamin D3 (CHOLECALCIFEROL) 1.25 MG (45051 UT) capsule Take 50,000 Units by  mouth         No Known Allergies     Social History     Tobacco Use     Smoking status: Never Smoker     Smokeless tobacco: Never Used   Substance Use Topics     Alcohol use: Never       History   Drug Use Unknown         Objective     /73 (BP Location: Left arm, Patient Position: Chair, Cuff Size: Adult Large)   Pulse 79   Temp 98  F (36.7  C) (Oral)   Resp 20   Wt 109.6 kg (241 lb 9.6 oz)   SpO2 98%   BMI 34.67 kg/m      Physical Exam  GENERAL APPEARANCE: healthy, alert and no distress  HENT: ear canals and TM's normal and nose and mouth without ulcers or lesions  RESP: lungs clear to auscultation - no rales, rhonchi or wheezes  CV: regular rate and rhythm, normal S1 S2, no S3 or S4 and no murmur, click or rub   ABDOMEN: soft, nontender, no HSM or masses and bowel sounds normal  MS: extremities normal- no gross deformities noted  NEURO: Normal strength and tone, sensory exam grossly normal, mentation intact and speech normal  PSYCH: mentation appears normal and affect normal/bright    Recent Labs   Lab Test 02/04/22  1216   HGB 12.3         POTASSIUM 4.4   CR 0.64   A1C 6.7*        Diagnostics:  Labs pending at this time.  Results will be reviewed when available.   No EKG required, no history of coronary heart disease, significant arrhythmia, peripheral arterial disease or other structural heart disease.    Revised Cardiac Risk Index (RCRI):  The patient has the following serious cardiovascular risks for perioperative complications:   - No serious cardiac risks = 0 points     RCRI Interpretation: 0 points: Class I (very low risk - 0.4% complication rate)           Signed Electronically by: Sujey Coelho MD  Copy of this evaluation report is provided to requesting physician.

## 2022-06-08 ENCOUNTER — TELEPHONE (OUTPATIENT)
Dept: FAMILY MEDICINE | Facility: CLINIC | Age: 47
End: 2022-06-08
Payer: COMMERCIAL

## 2022-06-08 DIAGNOSIS — E11.9 TYPE 2 DIABETES MELLITUS WITHOUT COMPLICATION, WITHOUT LONG-TERM CURRENT USE OF INSULIN (H): Primary | ICD-10-CM

## 2022-06-08 LAB
HCV AB SERPL QL IA: NONREACTIVE
HIV 1+2 AB+HIV1 P24 AG SERPL QL IA: NONREACTIVE

## 2022-06-08 RX ORDER — METFORMIN HCL 500 MG
TABLET, EXTENDED RELEASE 24 HR ORAL
Qty: 187 TABLET | Refills: 0 | Status: SHIPPED | OUTPATIENT
Start: 2022-06-08 | End: 2022-09-14

## 2022-06-08 NOTE — TELEPHONE ENCOUNTER
Please call patient, labs are all normal except for her diabetes, she does have DM, and I recommend that she follows up with her primary within 1 month please, meanwhile, I recommend metformin to take once daily, increase to 2 pills daily in 1 week, (start on medication after her surgery)  I also recommend repeating clean catch urine test during her visit to the primary to recheck UA (blood int he urine, mostly related to her heavy vaginal bleeding)    Sujey Coelho MD  [facility]  [phone]

## 2022-06-09 NOTE — TELEPHONE ENCOUNTER
2nd attempt- Called patient and left voicemail to call back to receive message from provider.    Becky Martin RN

## 2022-06-09 NOTE — TELEPHONE ENCOUNTER
Patient is returning a call.  Patient updated on the below, no questions at this time, verbalized understanding.    Jessica Cervantes RN

## 2022-06-13 ENCOUNTER — LAB (OUTPATIENT)
Dept: LAB | Facility: CLINIC | Age: 47
End: 2022-06-13
Payer: COMMERCIAL

## 2022-06-13 DIAGNOSIS — Z20.822 ENCOUNTER FOR LABORATORY TESTING FOR COVID-19 VIRUS: ICD-10-CM

## 2022-06-13 PROCEDURE — U0003 INFECTIOUS AGENT DETECTION BY NUCLEIC ACID (DNA OR RNA); SEVERE ACUTE RESPIRATORY SYNDROME CORONAVIRUS 2 (SARS-COV-2) (CORONAVIRUS DISEASE [COVID-19]), AMPLIFIED PROBE TECHNIQUE, MAKING USE OF HIGH THROUGHPUT TECHNOLOGIES AS DESCRIBED BY CMS-2020-01-R: HCPCS

## 2022-06-13 PROCEDURE — U0005 INFEC AGEN DETEC AMPLI PROBE: HCPCS

## 2022-06-14 LAB — SARS-COV-2 RNA RESP QL NAA+PROBE: NEGATIVE

## 2022-06-16 ENCOUNTER — LAB REQUISITION (OUTPATIENT)
Dept: LAB | Facility: CLINIC | Age: 47
End: 2022-06-16
Payer: COMMERCIAL

## 2022-06-16 PROCEDURE — 88305 TISSUE EXAM BY PATHOLOGIST: CPT | Mod: TC,ORL | Performed by: OBSTETRICS & GYNECOLOGY

## 2022-06-16 PROCEDURE — 88305 TISSUE EXAM BY PATHOLOGIST: CPT | Mod: 26 | Performed by: PATHOLOGY

## 2022-06-17 LAB
PATH REPORT.COMMENTS IMP SPEC: NORMAL
PATH REPORT.FINAL DX SPEC: NORMAL
PATH REPORT.GROSS SPEC: NORMAL
PATH REPORT.MICROSCOPIC SPEC OTHER STN: NORMAL
PATH REPORT.RELEVANT HX SPEC: NORMAL
PHOTO IMAGE: NORMAL

## 2022-11-30 ENCOUNTER — OFFICE VISIT (OUTPATIENT)
Dept: FAMILY MEDICINE | Facility: CLINIC | Age: 47
End: 2022-11-30
Payer: COMMERCIAL

## 2022-11-30 VITALS
SYSTOLIC BLOOD PRESSURE: 111 MMHG | DIASTOLIC BLOOD PRESSURE: 74 MMHG | BODY MASS INDEX: 34.99 KG/M2 | RESPIRATION RATE: 18 BRPM | HEART RATE: 78 BPM | TEMPERATURE: 97.8 F | HEIGHT: 70 IN | OXYGEN SATURATION: 99 % | WEIGHT: 244.4 LBS

## 2022-11-30 DIAGNOSIS — M62.838 TRAPEZIUS MUSCLE SPASM: ICD-10-CM

## 2022-11-30 DIAGNOSIS — E11.9 TYPE 2 DIABETES MELLITUS WITHOUT COMPLICATION, WITHOUT LONG-TERM CURRENT USE OF INSULIN (H): ICD-10-CM

## 2022-11-30 DIAGNOSIS — M25.511 ACUTE PAIN OF RIGHT SHOULDER: Primary | ICD-10-CM

## 2022-11-30 LAB
CREAT UR-MCNC: 160 MG/DL
HBA1C MFR BLD: 7.1 % (ref 0–5.6)
MICROALBUMIN UR-MCNC: 13 MG/L
MICROALBUMIN/CREAT UR: 8.13 MG/G CR (ref 0–25)

## 2022-11-30 PROCEDURE — 82043 UR ALBUMIN QUANTITATIVE: CPT | Performed by: PHYSICIAN ASSISTANT

## 2022-11-30 PROCEDURE — 83036 HEMOGLOBIN GLYCOSYLATED A1C: CPT | Performed by: PHYSICIAN ASSISTANT

## 2022-11-30 PROCEDURE — 36415 COLL VENOUS BLD VENIPUNCTURE: CPT | Performed by: PHYSICIAN ASSISTANT

## 2022-11-30 PROCEDURE — 99213 OFFICE O/P EST LOW 20 MIN: CPT | Performed by: PHYSICIAN ASSISTANT

## 2022-11-30 RX ORDER — METFORMIN HCL 500 MG
1000 TABLET, EXTENDED RELEASE 24 HR ORAL
Qty: 180 TABLET | Refills: 1 | Status: SHIPPED | OUTPATIENT
Start: 2022-11-30 | End: 2024-05-17

## 2022-11-30 ASSESSMENT — PAIN SCALES - GENERAL: PAINLEVEL: EXTREME PAIN (9)

## 2022-11-30 NOTE — LETTER
[facility]  [address] [place], [address]  [phone]        December 1, 2022    Lilia BIGGS [place]                                                                                                                                                       [address]  [place] [address]            Dear Lilia,     Your hemoglobin A1c (average blood sugars over 3 months) has gone up a little bit to 7.1%. Please work on increased exercise and limiting simple carbohydrates to help bring your blood sugars down. Your urine albumin was normal.     Marlyn Sanchez PA-C      Results for orders placed or performed in visit on 11/30/22   Albumin Random Urine Quantitative with Creat Ratio     Status: None   Result Value Ref Range    Albumin Urine mg/L 13.0 mg/L    Albumin Urine mg/g Cr 8.13 0.00 - 25.00 mg/g Cr    Creatinine Urine mg/dL 160.0 mg/dL   HEMOGLOBIN A1C     Status: Abnormal   Result Value Ref Range    Hemoglobin A1C 7.1 (H) 0.0 - 5.6 %

## 2022-11-30 NOTE — PROGRESS NOTES
"  Assessment & Plan     Acute pain of right shoulder  Ice, heat. Await MRI  - tiZANidine (ZANAFLEX) 4 MG tablet; Take 1 tablet (4 mg) by mouth 3 times daily as needed for muscle spasms  - MR Shoulder Right w/o Contrast; Future    Trapezius muscle spasm  See #1, tizanidine, heat    Type 2 diabetes mellitus without complication, without long-term current use of insulin (H)  Due for albs.   - Albumin Random Urine Quantitative with Creat Ratio; Future  - HEMOGLOBIN A1C; Future  - Albumin Random Urine Quantitative with Creat Ratio  - HEMOGLOBIN A1C             BMI:   Estimated body mass index is 35.07 kg/m  as calculated from the following:    Height as of this encounter: 1.778 m (5' 10\").    Weight as of this encounter: 110.9 kg (244 lb 6.4 oz).           No follow-ups on file.    [facility] MICHELA Rodrigues is a 47 year old accompanied by her friend, presenting for the following health issues:  Pain (Neck to elbow pain, right side)      History of Present Illness       Reason for visit:  Right shoulder helen radiating right elbow  Symptom onset:  1-2 weeks ago  Symptom intensity:  Severe  Symptom progression:  Worsening  Had these symptoms before:  No  What makes it worse:  Lifting, can't hold anything, lying on the arm hurts,  What makes it better:  I have tried steriod shots,pain medicine,heating pad,ice packs    She eats 2-3 servings of fruits and vegetables daily.She consumes 1 sweetened beverage(s) daily.She exercises with enough effort to increase her heart rate 20 to 29 minutes per day.  She exercises with enough effort to increase her heart rate 4 days per week.   She is taking medications regularly.       Pain History:  When did you first notice your pain? - Acute Pain   Have you seen anyone else for your pain? Yes - 11/5/22 seen in urgent care  Where in your body do you have pain? Neck Pain  Onset/Duration: 11/2/22  Description:   Location: " "neck  Radiation: into the right forearm  Intensity: severe  Progression of Symptoms:  worsening and intermittent  Accompanying Signs & Symptoms:  Burning, tingling, prickly sensation in arm(s): YES  Numbness in arm(s): YES  Weakness in arm(s):  YES  Fever: No  Headache: No  Nausea and/or vomiting: No  History:   Trauma: No  Previous neck pain: No  Previous surgery or injections: No  Previous Imaging (MRI,X ray): No  Precipitating or alleviating factors: None  Does movement impact the pain:  YES  Therapies tried and outcome: exercises and Ibuprofen, not helping      PATIENT was [facility] weeks ago for same issue. Given toradol injection and tizanidine with minimal relief.     Review of Systems   Constitutional, HEENT, cardiovascular, pulmonary, gi and gu systems are negative, except as otherwise noted.      Objective    /74 (BP Location: Right arm, Patient Position: Sitting, Cuff Size: Adult Large)   Pulse 78   Temp 97.8  F (36.6  C) (Oral)   Resp 18   Ht 1.778 m (5' 10\")   Wt 110.9 kg (244 lb 6.4 oz)   LMP 11/20/2022   SpO2 99%   BMI 35.07 kg/m    Body mass index is 35.07 kg/m .  Physical Exam   GENERAL APPEARANCE: healthy and alert  RESP: lungs clear to auscultation - no rales, rhonchi or wheezes  CV: regular rates and rhythm, normal S1 S2, no S3 or S4 and no murmur, click or rub  ORTHO:   SHOULDER Exam-Right   Inspection: no swelling, no bruising, no discoloration, no obvious deformity, no asymmetry, no glenohumeral joint anterior bulge, no distal clavicle elevation, no muscle atrophy, no scapular winging   Tenderness of: SC joint- no , clavicle(prox-mid)- no , clavicle-(mid-distal)- no , AC joint- no , acromion- no , anterior capsule- YES, prox bicep tendon- YES, greater tuberosity- no , prox humerus- no , supraspinatous- YES, infraspinatous- no , superior trapezious- YES, rhomboids- YES   Range of Motion: Active- limited due to pain.  Range of Motion: Passive- limited due to pain.    "   Special tests: Thomas(supraspinatous)- POSITIVE        Cervical Spine Exam: Inspection: normal cervical lordosis  Tender:  right paracervical muscles, right trapezius muscles  Non-tender:  left paracervical muscles, left trapezius muscles  Range of Motion:  Full ROM of cervical spine  Strength: Full strength of all neck muscles          PSYCH: mentation appears normal and affect normal/bright

## 2022-12-06 ENCOUNTER — HOSPITAL ENCOUNTER (OUTPATIENT)
Dept: MRI IMAGING | Facility: CLINIC | Age: 47
Discharge: HOME | End: 2022-12-06
Attending: PHYSICIAN ASSISTANT | Admitting: PHYSICIAN ASSISTANT
Payer: COMMERCIAL

## 2022-12-06 DIAGNOSIS — M67.813 BICEPS TENDINOSIS OF RIGHT SHOULDER: Primary | ICD-10-CM

## 2022-12-06 DIAGNOSIS — M25.511 ACUTE PAIN OF RIGHT SHOULDER: ICD-10-CM

## 2022-12-06 DIAGNOSIS — M75.101 TEAR OF RIGHT SUPRASPINATUS TENDON: ICD-10-CM

## 2022-12-06 PROCEDURE — 73221 MRI JOINT UPR EXTREM W/O DYE: CPT | Mod: RT

## 2022-12-06 PROCEDURE — 73221 MRI JOINT UPR EXTREM W/O DYE: CPT | Mod: 26 | Performed by: RADIOLOGY

## 2022-12-12 ENCOUNTER — OFFICE VISIT (OUTPATIENT)
Dept: ORTHOPEDICS | Facility: CLINIC | Age: 47
End: 2022-12-12
Attending: PHYSICIAN ASSISTANT
Payer: COMMERCIAL

## 2022-12-12 VITALS — BODY MASS INDEX: 35.15 KG/M2 | WEIGHT: 245 LBS | DIASTOLIC BLOOD PRESSURE: 78 MMHG | SYSTOLIC BLOOD PRESSURE: 115 MMHG

## 2022-12-12 DIAGNOSIS — M67.813 BICEPS TENDINOSIS OF RIGHT SHOULDER: ICD-10-CM

## 2022-12-12 DIAGNOSIS — M75.111 INCOMPLETE ROTATOR CUFF TEAR OR RUPTURE OF RIGHT SHOULDER, NOT SPECIFIED AS TRAUMATIC: Primary | ICD-10-CM

## 2022-12-12 PROCEDURE — 99204 OFFICE O/P NEW MOD 45 MIN: CPT | Mod: 25 | Performed by: ORTHOPAEDIC SURGERY

## 2022-12-12 PROCEDURE — 20610 DRAIN/INJ JOINT/BURSA W/O US: CPT | Mod: RT | Performed by: ORTHOPAEDIC SURGERY

## 2022-12-12 RX ORDER — BETAMETHASONE SODIUM PHOSPHATE AND BETAMETHASONE ACETATE 3; 3 MG/ML; MG/ML
6 INJECTION, SUSPENSION INTRA-ARTICULAR; INTRALESIONAL; INTRAMUSCULAR; SOFT TISSUE
Status: DISCONTINUED | OUTPATIENT
Start: 2022-12-12 | End: 2024-08-19

## 2022-12-12 RX ORDER — BUPIVACAINE HYDROCHLORIDE 2.5 MG/ML
4 INJECTION, SOLUTION EPIDURAL; INFILTRATION; INTRACAUDAL
Status: DISCONTINUED | OUTPATIENT
Start: 2022-12-12 | End: 2024-08-19

## 2022-12-12 RX ADMIN — BUPIVACAINE HYDROCHLORIDE 4 ML: 2.5 INJECTION, SOLUTION EPIDURAL; INFILTRATION; INTRACAUDAL at 12:07

## 2022-12-12 RX ADMIN — BETAMETHASONE SODIUM PHOSPHATE AND BETAMETHASONE ACETATE 6 MG: 3; 3 INJECTION, SUSPENSION INTRA-ARTICULAR; INTRALESIONAL; INTRAMUSCULAR; SOFT TISSUE at 12:07

## 2022-12-12 NOTE — PROGRESS NOTES
Large Joint Injection/Arthocentesis: R glenohumeral joint    Date/Time: 12/12/2022 12:07 PM  Performed by: Allison Avelar MD  Authorized by: Allison Avelar MD     Indications:  Pain  Needle Size:  22 G  Guidance: landmark guided    Location:  Shoulder      Site:  R glenohumeral joint  Medications:  4 mL bupivacaine HCl (PF) 0.25 %; 6 mg betamethasone acet & sod phos 6 (3-3) MG/ML  Outcome:  Tolerated well, no immediate complications  Procedure discussed: discussed risks, benefits, and alternatives    Consent Given by:  Patient  Timeout: timeout called immediately prior to procedure    Prep: patient was prepped and draped in usual sterile fashion

## 2022-12-12 NOTE — LETTER
12/12/2022         RE: Lilia Rosales  [address]  [place] [address]        Dear Colleague,    Thank you for referring your patient, Lilia Rosales, to the [facility] [place]. Please see a copy of my visit note below.    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  Symptoms started in 1 month. There no a precipitating event. The pain is located to the anterior that has progressed to posterior  shoulder. Worst pain is rated a 10  of 10, and current pain is rated at 7 of 10. Symptoms are worsened by liftig, lying on arm Patient is unable to hold items.. Symptoms are improved with rest . Patient has tried Toradol injections, pain medicine heating pad, ice with no relief. Associated symptoms include burning, sharp ain. Patient has pain radiating down the arm, yes numbness. Notably, the patient has had massages . No other concerns or complaints at this time.    PAST MEDICAL HISTORY:  Past Medical History:   Diagnosis Date     Thyroid disease        PAST SURGICAL HISTORY:  Past Surgical History:   Procedure Laterality Date     BREAST SURGERY Left 08/20/2014     COLONOSCOPY N/A 5/6/2022    Procedure: COLONOSCOPY, WITH POLYPECTOMY AND BIOPSY;  Surgeon: Edwige Norris MD;  Location: [facility]       CURRENT MEDICATIONS:  Current Outpatient Medications   Medication Sig Dispense Refill     metFORMIN (GLUCOPHAGE XR) 500 MG 24 hr tablet Take 2 tablets (1,000 mg) by mouth daily (with dinner) 180 tablet 1     norethindrone (AYGESTIN) 5 MG tablet  (Patient not taking: Reported on 11/30/2022)       tiZANidine (ZANAFLEX) 4 MG tablet Take 1 tablet (4 mg) by mouth 3 times daily as needed for muscle spasms 30 tablet 0       ALLERGIES:    No Known Allergies      FAMILY HISTORY: No  pertinent family history, reviewed in EMR.    SOCIAL HISTORY:   Social History     Socioeconomic History     Marital status:      Spouse name: Not on file     Number of children: Not on file     Years of education: Not on file     Highest education level: Not on file   Occupational History     Not on file   Tobacco Use     Smoking status: Never     Smokeless tobacco: Never   Vaping Use     Vaping Use: Never used   Substance and Sexual Activity     Alcohol use: Never     Drug use: Never     Sexual activity: Yes     Partners: Male     Birth control/protection: None   Other Topics Concern     Not on file   Social History Narrative     Not on file     Social Determinants of Health     Financial Resource Strain: Low Risk      Difficulty of Paying Living Expenses: Not hard at all   Food Insecurity: No Food Insecurity     Worried About Running Out of Food in the Last Year: Never true     Ran Out of Food in the Last Year: Never true   Transportation Needs: No Transportation Needs     Lack of Transportation (Medical): No     Lack of Transportation (Non-Medical): No   Physical Activity: Insufficiently Active     Days of Exercise per Week: 1 day     Minutes of Exercise per Session: 20 min   Stress: No Stress Concern Present     Feeling of Stress : Only a little   Social Connections: Moderately Integrated     Frequency of Communication with Friends and Family: More than three times a week     Frequency of Social Gatherings with Friends and Family: More than three times a week     Attends Hindu Services: 1 to 4 times per year     Active Member of Clubs or Organizations: No     Attends Club or Organization Meetings: Not on file     Marital Status:    Intimate Partner Violence: Not on file   Housing Stability: Low Risk      Unable to Pay for Housing in the Last Year: No     Number of Places Lived in the Last Year: 1     Unstable Housing in the Last Year: No       REVIEW OF SYSTEMS: Positive for that noted in past  medical history and history of present illness and otherwise reviewed in EMR    PHYSICAL EXAM:  Patient is Data Unavailable and weighs 245 lbs 0 oz /78   Wt 111.1 kg (245 lb)   LMP 11/20/2022   BMI 35.15 kg/m    Body mass index is 35.15 kg/m .   Constitutional: Well-developed, well-nourished, healthy appearing female.  Skin: Warm, dry   HEENT: Normal  Cardiac: Well perfused extremities, strong 2+ peripheral pulses. No edema.   Pulmonary: Breathing room air    Musculoskeletal:   Right Shoulder:  AROM right shoulder: 160/160/60/S1   AROM left shoulder: 160/160/60/T12   4/5 supraspinatus, 5/5 infraspinatus, 5/5 subscapularis  no AC joint pain, negative cross body adduction  positive Neer and Thomas impingement signs  negative belly-press/lift-off  positive Speed's test  negative Apprehension  Neurovascular exam and cervical spine exam are normal.      ADVANCED IMAGING: I personally reviewed the MRI which shows that there is no evidence of full-thickness rotator cuff tear.  There is some tendinosis at the bursal surface of the supraspinatus, tendinosis of the upper rolled border of the subscapularis and some edema around the biceps tendon.    IMPRESSION: 47 year old-year-old right hand dominant female, with right shoulder rotator cuff tendinosis    PLAN:     I discussed with the patient the etiology of their condition. We discussed at length the options as noted above.  I went through the results of the MRI with the patient today.  I discussed with the patient that I do not see any evidence of a full-thickness rotator cuff tear.  Discussed with the patient I believe the pain is coming from a combination of some rotator cuff tendinitis and biceps tendinitis.  Additionally there may be a component of cervical radiculopathy given that the patient is having some radiating pain and numbness.  I gave recommendations for conservative treatment measures including activity modification, heat and ice, over-the-counter  anti-inflammatories, cortisone injection, physical therapy.  The patient is diabetic with a last hemoglobin A1c of 7.1 so I do believe he would be okay to proceed with a cortisone injection.  Patient can follow-up in 2 to 3 months if needed.    At the conclusion of the office visit, Lilia verbally acknowledged that I answered all of her questions satisfactorily.    Parish Mckinley MD  Orthopedic Surgery Sports Medicine and Shoulder Surgery      Large Joint Injection/Arthocentesis    Date/Time: 12/12/2022 12:07 PM  Performed by: Parish Mckinley MD  Authorized by: Parish Mckinley MD     Indications:  Pain  Needle Size:  22 G  Guidance: landmark guided    Location:  Shoulder      Medications:  4 mL bupivacaine HCl (PF) 0.25 %; 6 mg betamethasone acet & sod phos 6 (3-3) MG/ML  Outcome:  Tolerated well, no immediate complications  Procedure discussed: discussed risks, benefits, and alternatives    Consent Given by:  Patient  Timeout: timeout called immediately prior to procedure    Prep: patient was prepped and draped in usual sterile fashion              Again, thank you for allowing me to participate in the care of your patient.        Sincerely,        PARISH MCKINLEY MD

## 2022-12-12 NOTE — PATIENT INSTRUCTIONS
[facility] [address], [address]  [address] [address]   Appointments: [phone] Appointments: [phone]   Fax: [phone] Fax: [phone]       Physical Therapy:   Orders for physical therapy have been placed. Please call [phone] to schedule at your convenience.       Please contact my office with any questions, [phone].

## 2022-12-12 NOTE — PROGRESS NOTES
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  Symptoms started in 1 month. There no a precipitating event. The pain is located to the anterior that has progressed to posterior  shoulder. Worst pain is rated a 10  of 10, and current pain is rated at 7 of 10. Symptoms are worsened by liftig, lying on arm Patient is unable to hold items.. Symptoms are improved with rest . Patient has tried Toradol injections, pain medicine heating pad, ice with no relief. Associated symptoms include burning, sharp ain. Patient has pain radiating down the arm, yes numbness. Notably, the patient has had massages . No other concerns or complaints at this time.    PAST MEDICAL HISTORY:  Past Medical History:   Diagnosis Date     Thyroid disease        PAST SURGICAL HISTORY:  Past Surgical History:   Procedure Laterality Date     BREAST SURGERY Left 08/20/2014     COLONOSCOPY N/A 5/6/2022    Procedure: COLONOSCOPY, WITH POLYPECTOMY AND BIOPSY;  Surgeon: Edwige Norris MD;  Location:  GI       CURRENT MEDICATIONS:  Current Outpatient Medications   Medication Sig Dispense Refill     metFORMIN (GLUCOPHAGE XR) 500 MG 24 hr tablet Take 2 tablets (1,000 mg) by mouth daily (with dinner) 180 tablet 1     norethindrone (AYGESTIN) 5 MG tablet  (Patient not taking: Reported on 11/30/2022)       tiZANidine (ZANAFLEX) 4 MG tablet Take 1 tablet (4 mg) by mouth 3 times daily as needed for muscle spasms 30 tablet 0       ALLERGIES:    No Known Allergies      FAMILY HISTORY: No pertinent family history, reviewed in EMR.    SOCIAL HISTORY:   Social History     Socioeconomic History     Marital status:      Spouse name: Not on file     Number of children: Not on file     Years of education: Not on file     Highest education level:  Not on file   Occupational History     Not on file   Tobacco Use     Smoking status: Never     Smokeless tobacco: Never   Vaping Use     Vaping Use: Never used   Substance and Sexual Activity     Alcohol use: Never     Drug use: Never     Sexual activity: Yes     Partners: Male     Birth control/protection: None   Other Topics Concern     Not on file   Social History Narrative     Not on file     Social Determinants of Health     Financial Resource Strain: Low Risk      Difficulty of Paying Living Expenses: Not hard at all   Food Insecurity: No Food Insecurity     Worried About Running Out of Food in the Last Year: Never true     Ran Out of Food in the Last Year: Never true   Transportation Needs: No Transportation Needs     Lack of Transportation (Medical): No     Lack of Transportation (Non-Medical): No   Physical Activity: Insufficiently Active     Days of Exercise per Week: 1 day     Minutes of Exercise per Session: 20 min   Stress: No Stress Concern Present     Feeling of Stress : Only a little   Social Connections: Moderately Integrated     Frequency of Communication with Friends and Family: More than three times a week     Frequency of Social Gatherings with Friends and Family: More than three times a week     Attends [employer] Services: 1 to 4 times per year     Active Member of Clubs or Organizations: No     Attends Club or Organization Meetings: Not on file     Marital Status:    Intimate Partner Violence: Not on file   Housing Stability: Low Risk      Unable to Pay for Housing in the Last Year: No     Number of Places Lived in the Last Year: 1     Unstable Housing in the Last Year: No       REVIEW OF SYSTEMS: Positive for that noted in past medical history and history of present illness and otherwise reviewed in EMR    PHYSICAL EXAM:  Patient is Data Unavailable and weighs 245 lbs 0 oz /78   Wt 111.1 kg (245 lb)   LMP 11/20/2022   BMI 35.15 kg/m    Body mass index is 35.15 kg/m .    Constitutional: Well-developed, well-nourished, healthy appearing female.  Skin: Warm, dry   HEENT: Normal  Cardiac: Well perfused extremities, strong 2+ peripheral pulses. No edema.   Pulmonary: Breathing room air    Musculoskeletal:   Right Shoulder:  AROM right shoulder: 160/160/60/S1   AROM left shoulder: 160/160/60/T12   4/5 supraspinatus, 5/5 infraspinatus, 5/5 subscapularis  no AC joint pain, negative cross body adduction  positive Neer and Thomas impingement signs  negative belly-press/lift-off  positive Speed's test  negative Apprehension  Neurovascular exam and cervical spine exam are normal.      ADVANCED IMAGING: I personally reviewed the MRI which shows that there is no evidence of full-thickness rotator cuff tear.  There is some tendinosis at the bursal surface of the supraspinatus, tendinosis of the upper rolled border of the subscapularis and some edema around the biceps tendon.    IMPRESSION: 47 year old-year-old right hand dominant female, with right shoulder rotator cuff tendinosis    PLAN:     I discussed with the patient the etiology of their condition. We discussed at length the options as noted above.  I went through the results of the MRI with the patient today.  I discussed with the patient that I do not see any evidence of a full-thickness rotator cuff tear.  Discussed with the patient I believe the pain is coming from a combination of some rotator cuff tendinitis and biceps tendinitis.  Additionally there may be a component of cervical radiculopathy given that the patient is having some radiating pain and numbness.  I gave recommendations for conservative treatment measures including activity modification, heat and ice, over-the-counter anti-inflammatories, cortisone injection, physical therapy.  The patient is diabetic with a last hemoglobin A1c of 7.1 so I do believe he would be okay to proceed with a cortisone injection.  Patient can follow-up in 2 to 3 months if needed.    At the  conclusion of the office visit, Lilia verbally acknowledged that I answered all of her questions satisfactorily.    Allison Avelar MD  Orthopedic Surgery Sports Medicine and Shoulder Surgery

## 2022-12-15 ENCOUNTER — THERAPY VISIT (OUTPATIENT)
Dept: PHYSICAL THERAPY | Facility: CLINIC | Age: 47
End: 2022-12-15
Payer: COMMERCIAL

## 2022-12-15 DIAGNOSIS — M67.813 BICEPS TENDINOSIS OF RIGHT SHOULDER: ICD-10-CM

## 2022-12-15 DIAGNOSIS — M75.111 INCOMPLETE ROTATOR CUFF TEAR OR RUPTURE OF RIGHT SHOULDER, NOT SPECIFIED AS TRAUMATIC: ICD-10-CM

## 2022-12-15 PROCEDURE — 97161 PT EVAL LOW COMPLEX 20 MIN: CPT | Mod: GP | Performed by: PHYSICAL THERAPIST

## 2022-12-15 PROCEDURE — 97110 THERAPEUTIC EXERCISES: CPT | Mod: GP | Performed by: PHYSICAL THERAPIST

## 2022-12-15 NOTE — PROGRESS NOTES
Physical Therapy Initial Evaluation  Subjective:  The history is provided by the patient. No  was used.   Patient Health History  Lilia Rosales being seen for Right shoulder and neck pain.     Problem began: 11/1/2022.   Problem occurred: unknown   Pain is reported as 5/10 on pain scale.  General health as reported by patient is good.  Pertinent medical history includes: diabetes and migraines/headaches. Other medical history details: left leg surgery - implant of bone.   Red flags:  None as reported by patient.          Other medications details: pain medication and steriods.    Current occupation is home health aide.   Primary job tasks include:  Lifting/carrying, pushing/pulling, driving and repetitive tasks.                  Therapist Generated HPI Evaluation  Problem details: Lilia Rosales is a very pleasant 47 year old, right-hand dominant female who presents for evaluation of right shoulder pain.  Symptoms started in 11/1/2022. There no a precipitating event. The pain is located to the anterior that has progressed to posterior  shoulder. Worst pain is rated a 9  of 10, and current pain is rated at 5 of 10 with raising arm overhead and no pain at rest. Symptoms are worsened by lifting, and lying on arm. Symptoms are improved with rest . Patient has had Toradol injections and cortisone injection, pain medicine heating pad, ice. Since the cortisone injection the shoulder pain has decreased and no longer has pain at rest. Pain and numbness is no longer radiating down the arm..         Type of problem:  Right shoulder.    This is a new condition.  Condition occurred with:  Unknown cause.                                           Objective:  System                   Shoulder Evaluation:  ROM:  AROM:    Flexion:  Right:  140 pain    Abduction:  Right:  145 pain in posterior shoulder.     Internal Rotation:  Right:  HBb  - pain and stiffness anterior shoulder  External Rotation:  Right:  HBH -  stiff top of shoulder                PROM:    Flexion:  Right: 145 pain      Abduction:  Right:  150 pain    Internal Rotation:  Right:  35 degrees, pain  External Rotation:  Right:  100                    Strength:    Flexion: Right: 4-/5      Pain:  +  Extension:  Right: 4/5    Pain:  Abduction:  Right: 3+/5      Pain:+  Adduction:  Right: 4/5      Pain:-  Internal Rotation:  Right: 3+/5      Pain:++  External Rotation:   Right:4-/5      Pain:+      Horizontal Adduction:  Right:/5     Pain:-      Stability Testing:  normal      Special Tests:      Right shoulder positive for the following special tests:Impingement  Palpation:  Palpation assessed shoulder: posterior cuff.    Right shoulder tenderness present at: Supraspinatus; Infraspinatus and Bicipital Groove  Mobility Tests:      Glenohumeral posterior right:  Hypomobile  Glenohumeral inferior right:  Hypomobile                                                 Assessment/Plan:    Patient is a 47 year old female with right side shoulder complaints.    Patient has the following significant findings with corresponding treatment plan.                Diagnosis 1:  Right shoulder RC tendonitis - subscapularis  Pain -  hot/cold therapy, US, electric stimulation, manual therapy, self management, education and home program  Decreased ROM/flexibility - manual therapy, therapeutic exercise and home program  Decreased joint mobility - manual therapy, therapeutic exercise and home program  Decreased strength - therapeutic exercise, therapeutic activities and home program  Inflammation - cold therapy, US, iontophoresis and self management/home program  Impaired posture - neuro re-education and home program    Cumulative Therapy Evaluation is: Low complexity.    Previous and current functional limitations:  (See Goal Flow Sheet for this information)    Short term and Long term goals: (See Goal Flow Sheet for this information)     Communication ability:  Patient appears to be  able to clearly communicate and understand verbal and written communication and follow directions correctly.  Treatment Explanation - The following has been discussed with the patient:   RX ordered/plan of care  Anticipated outcomes  This patient would benefit from PT intervention to resume normal activities.   Rehab potential is good.    Frequency:  1-2 X week, once daily  Duration:  for 6 weeks  Discharge Plan:  Achieve all LTG.  Independent in home treatment program.  Reach maximal therapeutic benefit.    Please refer to the daily flowsheet for treatment today, total treatment time and time spent performing 1:1 timed codes.

## 2022-12-15 NOTE — PROGRESS NOTES
[facility]    OUTPATIENT Physical Therapy ORTHOPEDIC EVALUATION  PLAN OF TREATMENT FOR OUTPATIENT REHABILITATION  (COMPLETE FOR INITIAL CLAIMS ONLY)  Patient's Last Name, First Name, M.I.  YOB: 1975  BobbyLilia  DIAN    Provider s Name:  [facility]   Medical Record No.  [id number]   Start of Care Date:  12/15/22   Onset Date:   11/01/22   Treatment Diagnosis:  right shoulder RC tendonitis Medical Diagnosis:     Biceps tendinosis of right shoulder  Incomplete rotator cuff tear or rupture of right shoulder, not specified as traumatic       Goals:     12/15/22 0500   Body Part   Goals listed below are for Right shoulder   Goal #1   Goal #1 reaching   Previous Functional Level No restrictions   Current Functional Level Cannot reach ;overhead;out to the side   Performance level without 5/10 pain   STG Target Performance behind back;overhead;out to the side   Performance level with less than 3/10 pain in right shoulder   Rationale for job requirements in their work place;for independent personal hygiene;for dressing   Due date 01/05/23   LTG Target Performance Reach;behind back;overhead;out to the side   Performance Level with minimal to no right shoulder pain   Rationale for dressing;for independent personal hygiene;for job requirements in their work place   Due date 01/26/23       Therapy Frequency:  1-2X/WEEK  Predicted Duration of Therapy Intervention:  6 WEEKS    [facility], PT                 I CERTIFY THE NEED FOR THESE SERVICES FURNISHED UNDER        THIS PLAN OF TREATMENT AND WHILE UNDER MY CARE     (Physician co-signature of this document indicates review and certification of the therapy plan).                     Certification Date From:  12/15/22   Certification Date To:  03/14/23    Referring Provider:  Allison Avelar    Initial Assessment        See Epic  Evaluation SOC Date: 12/15/22

## 2022-12-28 ENCOUNTER — OFFICE VISIT (OUTPATIENT)
Dept: URGENT CARE | Facility: URGENT CARE | Age: 47
End: 2022-12-28
Payer: COMMERCIAL

## 2022-12-28 ENCOUNTER — NURSE TRIAGE (OUTPATIENT)
Dept: FAMILY MEDICINE | Facility: CLINIC | Age: 47
End: 2022-12-28

## 2022-12-28 VITALS
SYSTOLIC BLOOD PRESSURE: 120 MMHG | TEMPERATURE: 98.3 F | WEIGHT: 245 LBS | HEART RATE: 84 BPM | DIASTOLIC BLOOD PRESSURE: 66 MMHG | RESPIRATION RATE: 14 BRPM | BODY MASS INDEX: 35.15 KG/M2 | OXYGEN SATURATION: 98 %

## 2022-12-28 DIAGNOSIS — R53.83 FATIGUE, UNSPECIFIED TYPE: ICD-10-CM

## 2022-12-28 DIAGNOSIS — H81.10 BENIGN PAROXYSMAL POSITIONAL VERTIGO, UNSPECIFIED LATERALITY: Primary | ICD-10-CM

## 2022-12-28 LAB
ERYTHROCYTE [DISTWIDTH] IN BLOOD BY AUTOMATED COUNT: 15.1 % (ref 10–15)
HCT VFR BLD AUTO: 39.2 % (ref 35–47)
HGB BLD-MCNC: 13 G/DL (ref 11.7–15.7)
MCH RBC QN AUTO: 26.3 PG (ref 26.5–33)
MCHC RBC AUTO-ENTMCNC: 33.2 G/DL (ref 31.5–36.5)
MCV RBC AUTO: 79 FL (ref 78–100)
PLATELET # BLD AUTO: 244 10E3/UL (ref 150–450)
RBC # BLD AUTO: 4.95 10E6/UL (ref 3.8–5.2)
WBC # BLD AUTO: 8.4 10E3/UL (ref 4–11)

## 2022-12-28 PROCEDURE — 85027 COMPLETE CBC AUTOMATED: CPT | Performed by: PHYSICIAN ASSISTANT

## 2022-12-28 PROCEDURE — 82306 VITAMIN D 25 HYDROXY: CPT | Performed by: PHYSICIAN ASSISTANT

## 2022-12-28 PROCEDURE — 99214 OFFICE O/P EST MOD 30 MIN: CPT | Performed by: PHYSICIAN ASSISTANT

## 2022-12-28 PROCEDURE — 80053 COMPREHEN METABOLIC PANEL: CPT | Performed by: PHYSICIAN ASSISTANT

## 2022-12-28 PROCEDURE — 36415 COLL VENOUS BLD VENIPUNCTURE: CPT | Performed by: PHYSICIAN ASSISTANT

## 2022-12-28 RX ORDER — MECLIZINE HYDROCHLORIDE 25 MG/1
25 TABLET ORAL 3 TIMES DAILY PRN
Qty: 30 TABLET | Refills: 0 | Status: SHIPPED | OUTPATIENT
Start: 2022-12-28 | End: 2024-05-17

## 2022-12-28 NOTE — TELEPHONE ENCOUNTER
Patient is at [facility] currently.  Called patient and advised since at [facility] to be seen and they will advise ER if needed.  Kelly Alejo RN

## 2022-12-28 NOTE — PROGRESS NOTES
Assessment & Plan     Benign paroxysmal positional vertigo, unspecified laterality  Symptoms and exam suggestive of vertigo.  Trial of meclizine.  Physical therapy referral for vestibular rehabilitation.  Patient educational information provided regarding course of symptoms.  Keep monitoring symptoms.  Follow-up if any worsening symptoms.  Patient agrees.  CMP vitamin D  - meclizine (ANTIVERT) 25 MG tablet  Dispense: 30 tablet; Refill: 0  - Physical Therapy Referral    Fatigue, unspecified type  I believe in the setting of her vertigo.  Her vitals today are within normal limit.  On exam she is in no acute distress.  She has a history of anemia.  We will check a CBC.  Also has a history of vitamin D deficiency, we will check vitamin D.  CMP is pending to check for electrolytes and  kidney function.   She will be notified if any abnormal result.  Keep monitoring symptoms.  Follow-up if any worsening symptoms.  She agrees with the plan.  - CBC with platelets  - Comprehensive metabolic panel (BMP + Alb, Alk Phos, ALT, AST, Total. Bili, TP)  - Vitamin D Deficiency           Return in about 1 week (around 1/4/2023) for follow up with physical therapy if symptoms not improving.    Josy Baker PA-C  [facility] [place] is a 47 year old female who presents to clinic today for the following health issues:  Chief Complaint   Patient presents with     Dizziness     Dizzy, weakness x 7 days      HPI    Patient is presenting to urgent care today with a complaint of dizziness. Onset of symptoms for the past 1 week.  She notes she also feels generalized weakness.  Reports nausea.  Notes the room is spinning.  Symptoms are worse with positional change.  Treatment tried Tylenol--- did not help.  No fevers or chills.  No diarrhea.  No abdominal pain.  No chest pain or shortness of breath.  No headache.      Review of Systems  Constitutional, HEENT, cardiovascular, pulmonary, GI, ,  musculoskeletal, neuro, skin, endocrine and psych systems are negative, except as otherwise noted.      Objective    /66 (BP Location: Right arm, Patient Position: Chair, Cuff Size: Adult Regular)   Pulse 84   Temp 98.3  F (36.8  C) (Oral)   Resp 14   Wt 111.1 kg (245 lb)   LMP 11/20/2022 (Approximate)   SpO2 98%   BMI 35.15 kg/m    Physical Exam   GENERAL: healthy, alert and no distress  EYES: Eyes grossly normal to inspection, PERRL and conjunctivae and sclerae normal  HENT: ear canals and TM's normal,  mouth without ulcers or lesions  RESP: lungs clear to auscultation - no rales, rhonchi or wheezes  CV: regular rate and rhythm, normal S1 S2  MS: no gross musculoskeletal defects noted, no edema  SKIN: no suspicious lesions or rashes  NEURO: mentation intact and speech normal, dizziness reproduced with position change from supine to sitting.    Results for orders placed or performed in visit on 12/28/22 (from the past 24 hour(s))   CBC with platelets   Result Value Ref Range    WBC Count 8.4 4.0 - 11.0 10e3/uL    RBC Count 4.95 3.80 - 5.20 10e6/uL    Hemoglobin 13.0 11.7 - 15.7 g/dL    Hematocrit 39.2 35.0 - 47.0 %    MCV 79 78 - 100 fL    MCH 26.3 (L) 26.5 - 33.0 pg    MCHC 33.2 31.5 - 36.5 g/dL    RDW 15.1 (H) 10.0 - 15.0 %    Platelet Count 244 150 - 450 10e3/uL

## 2022-12-28 NOTE — TELEPHONE ENCOUNTER
Sounds like vertigo but should be evaluated in ED.      Provider Response to 2nd Level Triage Request    I have reviewed the RN documentation. My recommendation is:  Refer to ED

## 2022-12-28 NOTE — TELEPHONE ENCOUNTER
"Patient calling to report dizziness, weakness, headache. Reports it has been happening for about a week but now it is severe and she is unable to stand and has to hold on to things when she is walking around.  Patient is also reporting a headache and nausea w/ vomiting. Patient BS was normal at 132, unable to take her pulse.     Per protocol, advised patient to go to an ED/UCC. Routing to provider for further recommendation. Patient reports she will go to an urgent care/ED.    Callback number: [phone].     Reason for Disposition    SEVERE dizziness (e.g., unable to stand, requires support to walk, feels like passing out now)    Additional Information    Negative: SEVERE difficulty breathing (e.g., struggling for each breath, speaks in single words)    Negative: Shock suspected (e.g., cold/pale/clammy skin, too weak to stand, low BP, rapid pulse)    Negative: Difficult to awaken or acting confused (e.g., disoriented, slurred speech)    Negative: Fainted, and still feels dizzy afterwards    Negative: Overdose (accidental or intentional) of medications    Negative: New neurologic deficit that is present now: * Weakness of the face, arm, or leg on one side of the body * Numbness of the face, arm, or leg on one side of the body * Loss of speech or garbled speech    Negative: Heart beating < 50 beats per minute OR > 140 beats per minute    Negative: Sounds like a life-threatening emergency to the triager    Negative: Chest pain    Negative: Rectal bleeding, bloody stool, or tarry-black stool    Negative: Vomiting is main symptom    Negative: Diarrhea is main symptom    Negative: Headache is main symptom    Negative: Heat exhaustion suspected (i.e., dehydration from heat exposure)    Negative: Patient states that they are having an anxiety or panic attack    Negative: Dizziness from low blood sugar (i.e., < 60 mg/dl or 3.5 mmol/l)    Answer Assessment - Initial Assessment Questions  1. DESCRIPTION: \"Describe your " "dizziness.\"      Feeling dizzy for almost a week, especially when she gets up but also when she is laying. Feeling off balance.   2. LIGHTHEADED: \"Do you feel lightheaded?\" (e.g., somewhat faint, woozy, weak upon standing)      Does feel like she is going to faint sometimes.   3. VERTIGO: \"Do you feel like either you or the room is spinning or tilting?\" (i.e. vertigo)      Yes - when she is laying in bed and trying to change positions it feels like the room is spinning.   4. SEVERITY: \"How bad is it?\"  \"Do you feel like you are going to faint?\" \"Can you stand and walk?\"    - MILD: Feels slightly dizzy, but walking normally.    - MODERATE: Feels unsteady when walking, but not falling; interferes with normal activities (e.g., school, work).    - SEVERE: Unable to walk without falling, or requires assistance to walk without falling; feels like passing out now.       Moderate to severe. Doesn't feel like she is going to pass out, has to hold onto things when she is walking. Right now she is unable to stand up.   5. ONSET:  \"When did the dizziness begin?\"      A week ago.   6. AGGRAVATING FACTORS: \"Does anything make it worse?\" (e.g., standing, change in head position)      Changing head position.   7. HEART RATE: \"Can you tell me your heart rate?\" \"How many beats in 15 seconds?\"  (Note: not all patients can do this)        Unable   8. CAUSE: \"What do you think is causing the dizziness?\"      Unsure   9. RECURRENT SYMPTOM: \"Have you had dizziness before?\" If Yes, ask: \"When was the last time?\" \"What happened that time?\"      Yes, happened a couple years ago but it went away.   10. OTHER SYMPTOMS: \"Do you have any other symptoms?\" (e.g., fever, chest pain, vomiting, diarrhea, bleeding)        Headache, weakness, vomiting 2x, nausea. No fever, diarrhea, or chest pain today. Had a fever about 2 days ago.  Took BS and it was 132.   11. PREGNANCY: \"Is there any chance you are pregnant?\" \"When was your last menstrual " "period?\"        NO    Protocols used: MITZI-DIAN-ДМИТРИЙ Rowell RN  [facility]      "

## 2022-12-29 LAB
ALBUMIN SERPL BCG-MCNC: 3.9 G/DL (ref 3.5–5.2)
ALP SERPL-CCNC: 116 U/L (ref 35–104)
ALT SERPL W P-5'-P-CCNC: 16 U/L (ref 10–35)
ANION GAP SERPL CALCULATED.3IONS-SCNC: 12 MMOL/L (ref 7–15)
AST SERPL W P-5'-P-CCNC: 17 U/L (ref 10–35)
BILIRUB SERPL-MCNC: 0.3 MG/DL
BUN SERPL-MCNC: 8.8 MG/DL (ref 6–20)
CALCIUM SERPL-MCNC: 9.3 MG/DL (ref 8.6–10)
CHLORIDE SERPL-SCNC: 103 MMOL/L (ref 98–107)
CREAT SERPL-MCNC: 0.6 MG/DL (ref 0.51–0.95)
DEPRECATED CALCIDIOL+CALCIFEROL SERPL-MC: 21 UG/L (ref 20–75)
DEPRECATED HCO3 PLAS-SCNC: 21 MMOL/L (ref 22–29)
GFR SERPL CREATININE-BSD FRML MDRD: >90 ML/MIN/1.73M2
GLUCOSE SERPL-MCNC: 163 MG/DL (ref 70–99)
POTASSIUM SERPL-SCNC: 4.3 MMOL/L (ref 3.4–5.3)
PROT SERPL-MCNC: 7.7 G/DL (ref 6.4–8.3)
SODIUM SERPL-SCNC: 136 MMOL/L (ref 136–145)

## 2023-01-05 PROBLEM — R73.03 PREDIABETES: Status: ACTIVE | Noted: 2020-08-06

## 2023-02-12 ENCOUNTER — HEALTH MAINTENANCE LETTER (OUTPATIENT)
Age: 48
End: 2023-02-12

## 2023-03-02 ENCOUNTER — OFFICE VISIT (OUTPATIENT)
Dept: FAMILY MEDICINE | Facility: CLINIC | Age: 48
End: 2023-03-02
Payer: COMMERCIAL

## 2023-03-02 VITALS
BODY MASS INDEX: 35.31 KG/M2 | HEIGHT: 69 IN | TEMPERATURE: 98 F | OXYGEN SATURATION: 98 % | WEIGHT: 238.4 LBS | DIASTOLIC BLOOD PRESSURE: 81 MMHG | RESPIRATION RATE: 11 BRPM | SYSTOLIC BLOOD PRESSURE: 116 MMHG | HEART RATE: 87 BPM

## 2023-03-02 DIAGNOSIS — Z71.84 ENCOUNTER FOR COUNSELING FOR TRAVEL: Primary | ICD-10-CM

## 2023-03-02 DIAGNOSIS — Z23 NEED FOR MENINGOCOCCAL VACCINATION: ICD-10-CM

## 2023-03-02 DIAGNOSIS — Z23 NEED FOR HEPATITIS A AND B VACCINATION: ICD-10-CM

## 2023-03-02 DIAGNOSIS — Z23 NEED FOR IMMUNIZATION AGAINST TYPHOID: ICD-10-CM

## 2023-03-02 DIAGNOSIS — Z23 NEED FOR IMMUNIZATION AGAINST YELLOW FEVER: ICD-10-CM

## 2023-03-02 PROCEDURE — 90471 IMMUNIZATION ADMIN: CPT | Performed by: PHYSICIAN ASSISTANT

## 2023-03-02 PROCEDURE — 99401 PREV MED CNSL INDIV APPRX 15: CPT | Mod: 25 | Performed by: PHYSICIAN ASSISTANT

## 2023-03-02 PROCEDURE — 90717 YELLOW FEVER VACCINE SUBQ: CPT | Performed by: PHYSICIAN ASSISTANT

## 2023-03-02 PROCEDURE — 90619 MENACWY-TT VACCINE IM: CPT | Performed by: PHYSICIAN ASSISTANT

## 2023-03-02 PROCEDURE — 90472 IMMUNIZATION ADMIN EACH ADD: CPT | Performed by: PHYSICIAN ASSISTANT

## 2023-03-02 PROCEDURE — 90636 HEP A/HEP B VACC ADULT IM: CPT | Performed by: PHYSICIAN ASSISTANT

## 2023-03-02 PROCEDURE — 90691 TYPHOID VACCINE IM: CPT | Performed by: PHYSICIAN ASSISTANT

## 2023-03-02 RX ORDER — AZITHROMYCIN 500 MG/1
TABLET, FILM COATED ORAL
Qty: 9 TABLET | Refills: 0 | Status: SHIPPED | OUTPATIENT
Start: 2023-03-02 | End: 2024-05-17

## 2023-03-02 RX ORDER — GUAIFENESIN 600 MG/1
1200 TABLET, EXTENDED RELEASE ORAL 2 TIMES DAILY
Qty: 40 TABLET | Refills: 0 | Status: SHIPPED | OUTPATIENT
Start: 2023-03-02 | End: 2024-05-17

## 2023-03-02 RX ORDER — MEFLOQUINE HYDROCHLORIDE 250 MG/1
TABLET ORAL
Qty: 16 TABLET | Refills: 0 | Status: SHIPPED | OUTPATIENT
Start: 2023-03-02 | End: 2024-05-17

## 2023-03-02 NOTE — PROGRESS NOTES
SUBJECTIVE: Lilia Rosales , a 47 year old  female, presents for counseling and information regarding upcoming travel to [facility]. Special medical concerns include: none. She anticipates the following unusual exposures: none.    Itinerary:  [place], [place], [facility] [place]    Departure Date: 3/13/23 Return date: unknown    Reason for travel (i.e. Business, pleasure): pleasure    Visiting an urban or rural area?: urban    Accommodations (i.e. hotel, hostel, friends, family, etc): family    Women - First day of your last period: 2/2/23    IMMUNIZATION HISTORY  Have you received any vaccinations in the past 4 weeks?  No  Have you ever fainted from having your blood drawn or from an injection?  No  Have you ever had a fever reaction to vaccination?  No  Have you ever had any bad reaction or side effect from any vaccination?  No  Have you ever had hepatitis A or B vaccine?  unknown  Do you live (or work closely) with anyone who has AIDS, an AIDS-like condition, any other immune disorder or who is on chemotherapy for cancer?  No  Have you received any injection of immune globulin or any blood products during the past 12 months?  No    GENERAL MEDICAL HISTORY  Do you have a medical condition that warrants maintenance medication or physician follow-up?  No  Do you have a medical condition that is stable now, but that may recur while traveling?  No  Has your spleen been removed?  No  Have you had an acute illness or a fever in the past 48 hours?  No  Are you pregnant, or might you become pregnant on this trip?  Any chance of pregnancy?  No  Are you breastfeeding?  No  Do you have HIV, AIDS, an AIDS-like condition, any other immune disorder, leukemia or cancer?  No  Do you have a severe combined immunodeficiency disease?  No  Have you had your thymus gland removed or history of problems with your thymus, such as myasthenia gravis, DiGeorge syndrome, or thymoma?  No    Do you have severe thrombocytopenia (low platelet count) or  a coagulation disorder?  No  Have you ever had a convulsion, seizure, epilepsy, neurologic condition or brain infection?  No  Do you have any stomach conditions?  No  Do you have a G6PD deficiency?  No  Do you have severe renal or kidney impairment?  No  Do you have a history of psychiatric problems?  No  Do you have a problem with strange dreams and/or nightmares?  No  Do you have insomnia?  No  Do you have problems with vaginitis?  No  Do you have psoriasis?  No  Are you prone to motion sickness?  No  Have you ever had headaches, nausea, vomiting, or breathing problems from altitude exposure?  No      Past Medical History:   Diagnosis Date     Thyroid disease       Immunization History   Administered Date(s) Administered     COVID-19 Vaccine 18+ (Moderna) 03/03/2021, 04/30/2021     Influenza Vaccine >6 months (Alfuria,Fluzone) 01/04/2019, 11/12/2019     Influenza Vaccine, 6+MO IM (QUADRIVALENT W/PRESERVATIVES) 09/22/2015     Tdap (Adacel,Boostrix) 11/16/2010, 06/07/2022     Twinrix A/B 09/22/2015     Typhoid Oral 09/22/2015       Current Outpatient Medications   Medication Sig Dispense Refill     meclizine (ANTIVERT) 25 MG tablet Take 1 tablet (25 mg) by mouth 3 times daily as needed for dizziness or nausea 30 tablet 0     metFORMIN (GLUCOPHAGE XR) 500 MG 24 hr tablet Take 1,000 mg by mouth       metFORMIN (GLUCOPHAGE XR) 500 MG 24 hr tablet Take 2 tablets (1,000 mg) by mouth daily (with dinner) 180 tablet 1     methylPREDNISolone (MEDROL DOSEPAK) 4 MG tablet therapy pack FOLLOW PACKAGE DIRECTIONS       methylPREDNISolone (MEDROL DOSEPAK) 4 MG tablet therapy pack Take by mouth as instructed per packaging.       naproxen (NAPROSYN) 500 MG tablet        norethindrone (AYGESTIN) 5 MG tablet  (Patient not taking: Reported on 11/30/2022)       tiZANidine (ZANAFLEX) 4 MG tablet Take 1 tablet (4 mg) by mouth 3 times daily as needed for muscle spasms (Patient not taking: Reported on 12/28/2022) 30 tablet 0     No Known  Allergies     EXAM: deferred    Immunizations discussed include: Hepatitis A, Hepatitis B, Typhoid, Yellow Fever and Meningococcus  Malaria prophylaxis recommended: mefloquine  Symptomatic treatment for traveler's diarrhea: bismuth subsalicylate, loperamide/diphenoxylate and azithromycin    ASSESSMENT/PLAN:    (Z71.84) Encounter for counseling for travel  (primary encounter diagnosis)    Comment: Twinrix, typhoid, yellow fever, and meningococcal vaccines today. Patient will return or follow-up with PCP as needed. Prophylaxis given for Traveler's diarrhea and Malaria. All questions were answered.     Plan: mefloquine (LARIAM) 250 MG tablet, azithromycin        (ZITHROMAX) 500 MG tablet, guaiFENesin         (MUCINEX) 600 MG 12 hr tablet            (Z23) Need for hepatitis A and B vaccination  Comment:   Plan: HEPA/HEPB VACCINE ADULT IM            (Z23) Need for immunization against typhoid  Comment:   Plan: TYPHOID VACCINE, IM            (Z23) Need for immunization against yellow fever  Comment:   Plan: YELLOW FEVER, LIVE SQ            (Z23) Need for meningococcal vaccination  Comment:   Plan: MENINGOCOCCAL ACWY 2-55Y (MENQUADFI )              I have reviewed general recommendations for safe travel   including: food/water precautions, insect avoidance, safe sex   practices given high prevalence of HIV and other STDs,   roadway safety. Educational materials and links to the CDC   Traveler's health website have been provided.    Total time 15 minutes, greater than 50 percent in counseling   and coordination of care.

## 2023-03-02 NOTE — PATIENT INSTRUCTIONS
"See travel packet provided  Recommend ultrathon (mosquito repellant), pepto bismol and imodium  The food and drink choices you make while traveling can impact your likelihood of getting sick.   If you aren't sure if a food or drink is safe, the saying \" BOIL IT, COOK IT, PEEL IT, OR FORGET IT\" can help you decide whether it's okay to consume.   Also bring hand  and sun screen with you.  Safe Travels         Today March 2, 2023 you received the    Twinrix (Hepatitis A & B combo) Vaccine - Please return on 4/1/23 for your 2nd dose and 9/1/23 or later for your 3rd and final dose.    Yellow Fever (YF)    Meningococcal (Menactra) Vaccine    Typhoid - injectable. This vaccine is valid for two years. .    These appointments can be made as a NURSE ONLY visit.    **It is very important for the vaccinations to be given on the scheduled day(s), this helps ensure you receive the full effectiveness of the vaccine.**    Please call [facility] with any questions [phone]    Thank you for visiting [facility]    "

## 2023-05-20 ENCOUNTER — HEALTH MAINTENANCE LETTER (OUTPATIENT)
Age: 48
End: 2023-05-20

## 2023-10-22 ENCOUNTER — HEALTH MAINTENANCE LETTER (OUTPATIENT)
Age: 48
End: 2023-10-22

## 2023-11-01 ENCOUNTER — TRANSFERRED RECORDS (OUTPATIENT)
Dept: MULTI SPECIALTY CLINIC | Facility: CLINIC | Age: 48
End: 2023-11-01

## 2023-11-01 LAB — RETINOPATHY: NORMAL

## 2023-11-15 ENCOUNTER — TRANSFERRED RECORDS (OUTPATIENT)
Dept: MULTI SPECIALTY CLINIC | Facility: CLINIC | Age: 48
End: 2023-11-15

## 2024-03-10 ENCOUNTER — HEALTH MAINTENANCE LETTER (OUTPATIENT)
Age: 49
End: 2024-03-10

## 2024-04-16 ENCOUNTER — TELEPHONE (OUTPATIENT)
Dept: FAMILY MEDICINE | Facility: CLINIC | Age: 49
End: 2024-04-16

## 2024-04-17 NOTE — TELEPHONE ENCOUNTER
Reason for Call:  Appointment Request    Patient requesting this type of appt:  Preventive     Requested provider:  any    Reason patient unable to be scheduled:  Will not allow me to schedule 2 appts on the same day    When does patient want to be seen/preferred time:  any    Comments: Sister called would like to sched same day with same provider with Sibling. Please call and advise    Could we send this information to you in [place] or would you prefer to receive a phone call?:   Patient would prefer a phone call     Okay to leave a detailed message?: Yes at Other phone number:  [phone]    Call taken on 4/16/2024 at 8:33 PM by German Issa

## 2024-04-17 NOTE — TELEPHONE ENCOUNTER
Appointment scheduled for 04/23 at 3:40PM with Dr. Coelho. Unable to reach patient will send letter. Ruth Behrens.

## 2024-05-17 ENCOUNTER — OFFICE VISIT (OUTPATIENT)
Dept: FAMILY MEDICINE | Facility: CLINIC | Age: 49
End: 2024-05-17
Payer: MEDICAID

## 2024-05-17 VITALS
DIASTOLIC BLOOD PRESSURE: 84 MMHG | TEMPERATURE: 97.4 F | SYSTOLIC BLOOD PRESSURE: 129 MMHG | HEIGHT: 70 IN | BODY MASS INDEX: 31.56 KG/M2 | RESPIRATION RATE: 12 BRPM | WEIGHT: 220.46 LBS | OXYGEN SATURATION: 99 % | HEART RATE: 74 BPM

## 2024-05-17 DIAGNOSIS — E11.9 TYPE 2 DIABETES MELLITUS WITHOUT COMPLICATION, WITHOUT LONG-TERM CURRENT USE OF INSULIN (H): ICD-10-CM

## 2024-05-17 DIAGNOSIS — M75.101 TEAR OF RIGHT SUPRASPINATUS TENDON: Primary | ICD-10-CM

## 2024-05-17 DIAGNOSIS — M67.813 BICEPS TENDINOSIS OF RIGHT SHOULDER: ICD-10-CM

## 2024-05-17 DIAGNOSIS — M54.12 CERVICAL RADICULOPATHY: ICD-10-CM

## 2024-05-17 PROBLEM — E66.01 MORBID OBESITY (H): Status: RESOLVED | Noted: 2022-02-04 | Resolved: 2024-05-17

## 2024-05-17 LAB — HBA1C MFR BLD: 6.6 % (ref 0–5.6)

## 2024-05-17 PROCEDURE — 80061 LIPID PANEL: CPT | Performed by: PHYSICIAN ASSISTANT

## 2024-05-17 PROCEDURE — 82043 UR ALBUMIN QUANTITATIVE: CPT | Performed by: PHYSICIAN ASSISTANT

## 2024-05-17 PROCEDURE — 82570 ASSAY OF URINE CREATININE: CPT | Performed by: PHYSICIAN ASSISTANT

## 2024-05-17 PROCEDURE — 99214 OFFICE O/P EST MOD 30 MIN: CPT | Performed by: PHYSICIAN ASSISTANT

## 2024-05-17 PROCEDURE — 80048 BASIC METABOLIC PNL TOTAL CA: CPT | Performed by: PHYSICIAN ASSISTANT

## 2024-05-17 PROCEDURE — 36415 COLL VENOUS BLD VENIPUNCTURE: CPT | Performed by: PHYSICIAN ASSISTANT

## 2024-05-17 PROCEDURE — 83036 HEMOGLOBIN GLYCOSYLATED A1C: CPT | Performed by: PHYSICIAN ASSISTANT

## 2024-05-17 RX ORDER — METFORMIN HCL 500 MG
500 TABLET, EXTENDED RELEASE 24 HR ORAL 2 TIMES DAILY WITH MEALS
Qty: 180 TABLET | Refills: 1 | Status: SHIPPED | OUTPATIENT
Start: 2024-05-17

## 2024-05-17 NOTE — LETTER
May 28, 2024      Lilia Rosales  [address] [place] [address]        Dear ,    We are writing to inform you of your test results.    Your test results fall within the expected range(s) or remain unchanged from previous results.  Please continue with current treatment plan. Cholesterol looks good. Due to the diabetes we should still consider a medication to help with cholesterol. We can discuss this at your next appointment. ...     Resulted Orders   Lipid panel reflex to direct LDL Non-fasting   Result Value Ref Range    Cholesterol 189 <200 mg/dL    Triglycerides 197 (H) <150 mg/dL    Direct Measure HDL 53 >=50 mg/dL    LDL Cholesterol Calculated 97 <=100 mg/dL    Non HDL Cholesterol 136 (H) <130 mg/dL    Patient Fasting > 8hrs? Yes     Narrative    Cholesterol  Desirable:  <200 mg/dL    Triglycerides  Normal:  Less than 150 mg/dL  Borderline High:  150-199 mg/dL  High:  200-499 mg/dL  Very High:  Greater than or equal to 500 mg/dL    Direct Measure HDL  Female:  Greater than or equal to 50 mg/dL   Male:  Greater than or equal to 40 mg/dL    LDL Cholesterol  Desirable:  <100mg/dL  Above Desirable:  100-129 mg/dL   Borderline High:  130-159 mg/dL   High:  160-189 mg/dL   Very High:  >= 190 mg/dL    Non HDL Cholesterol  Desirable:  130 mg/dL  Above Desirable:  130-159 mg/dL  Borderline High:  160-189 mg/dL  High:  190-219 mg/dL  Very High:  Greater than or equal to 220 mg/dL   Albumin Random Urine Quantitative with Creat Ratio   Result Value Ref Range    Creatinine Urine mg/dL 95.5 mg/dL      Comment:      The reference ranges have not been established in urine creatinine. The results should be integrated into the clinical context for interpretation.    Albumin Urine mg/L <12.0 mg/L      Comment:      The reference ranges have not been established in urine albumin. The results should be integrated into the clinical context for interpretation.    Albumin Urine mg/g Cr        Comment:      Unable to  calculate, urine albumin and/or urine creatinine is outside detectable limits.  Microalbuminuria is defined as an albumin:creatinine ratio of 17 to 299 for males and 25 to 299 for females. A ratio of albumin:creatinine of 300 or higher is indicative of overt proteinuria.  Due to biologic variability, positive results should be confirmed by a second, first-morning random or 24-hour timed urine specimen. If there is discrepancy, a third specimen is recommended. When 2 out of 3 results are in the microalbuminuria range, this is evidence for incipient nephropathy and warrants increased efforts at glucose control, blood pressure control, and institution of therapy with an angiotensin-converting-enzyme (ACE) inhibitor (if the patient can tolerate it).     BASIC METABOLIC PANEL   Result Value Ref Range    Sodium 140 135 - 145 mmol/L      Comment:      Reference intervals for this test were updated on 09/26/2023 to more accurately reflect our healthy population. There may be differences in the flagging of prior results with similar values performed with this method. Interpretation of those prior results can be made in the context of the updated reference intervals.     Potassium 4.9 3.4 - 5.3 mmol/L    Chloride 102 98 - 107 mmol/L    Carbon Dioxide (CO2) 26 22 - 29 mmol/L    Anion Gap 12 7 - 15 mmol/L    Urea Nitrogen 9.6 6.0 - 20.0 mg/dL    Creatinine 0.66 0.51 - 0.95 mg/dL    GFR Estimate >90 >60 mL/min/1.73m2    Calcium 9.0 8.6 - 10.0 mg/dL    Glucose 138 (H) 70 - 99 mg/dL    Patient Fasting > 8hrs? Yes        If you have any questions or concerns, please call the clinic at the number listed above.     Sincerely,      Lore Donnelly PA-C

## 2024-05-17 NOTE — Clinical Note
Please abstract the following data from this visit with this patient into the appropriate field in Epic:  Tests that can be patient reported without a hard copy:  Mammogram done on this date: 11/1/23 (approximately), by this group: outside of the country, results were normal.  and Eye exam with ophthalmology on this date: 11/1/23 Exam Location: outside the country  Other Tests found in the patient's chart through Chart Review/Care Everywhere:  {Abstract Quality List (Optional):414083}  Note to Abstraction: If this section is blank, no results were found via Chart Review/Care Everywhere.

## 2024-05-17 NOTE — PROGRESS NOTES
"  Assessment & Plan     Tear of right supraspinatus tendon  She continues to have pain that decreases her ability to do her everyday activities and limits what she can do for work.  I recommended she follow up with PT and orthopedics for further evaluation.  Patient is right hand dominant. Form completed today as patient is unable to work due to her shoulder pain.  - Orthopedic  Referral; Future  - Physical Therapy  Referral; Future    Biceps tendinosis of right shoulder  As noted above.  - Orthopedic  Referral; Future  - Physical Therapy  Referral; Future    Cervical radiculopathy  Noted when seen by orthopedics in 2022. Patient mentions some referred pain as well. Follow up with orthopedics as planned.    Type 2 diabetes mellitus without complication, without long-term current use of insulin (H)  Due for labs for diabetes.  Good control of diabetes with A1c 6.6%. Continue current treatment.  She reports her eye exam is up to date.  - Lipid panel reflex to direct LDL Non-fasting; Future  - HEMOGLOBIN A1C; Future  - Albumin Random Urine Quantitative with Creat Ratio; Future  - BASIC METABOLIC PANEL; Future  - metFORMIN (GLUCOPHAGE XR) 500 MG 24 hr tablet; Take 1 tablet (500 mg) by mouth 2 times daily (with meals)  - Lipid panel reflex to direct LDL Non-fasting  - HEMOGLOBIN A1C  - Albumin Random Urine Quantitative with Creat Ratio  - BASIC METABOLIC PANEL    November in 2023 had eye exam  Mammogram in November 2023    Review of external notes as documented elsewhere in note  Review of the result(s) of each unique test - As noted above  Ordering of each unique test  Prescription drug management      Subjective   Lilia is a 49 year old, presenting for the following health issues:  Musculoskeletal Problem (Xunknown, worsening last x1 week, R shoulder pain, MRI done - pt states was told she has fluid in her shoulder and \"tissue damage\" )        5/17/2024     8:40 AM   Additional " "Questions   Roomed by AT     Via the Health Maintenance questionnaire, the patient has reported the following services have been completed -[place]15, this information has been sent to the abstraction team.  History of Present Illness       Reason for visit:  R shoulder pain        Pain History:  When did you first notice your pain? 1 week    Have you seen anyone else for your pain? Yes - Daniella Lopez   How has your pain affected your ability to work? Not applicable  Where in your body do you have pain? Musculoskeletal problem/pain  Onset/Duration: 1 week   Description  Location: shoulder  - right  Joint Swelling: YES- on the L knee  Redness: No  Pain: YES  Warmth: YES  Intensity:  moderate, severe, intermittent, worsens with cold weather  Progression of Symptoms:  intermittent and waxing and waning  Accompanying signs and symptoms:   Fevers: No  Numbness/tingling/weakness: YES  History  Trauma to the area: No  Recent illness:  No  Previous similar problem: YES  Previous evaluation:  No  Precipitating or alleviating factors:  Aggravating factors include: cold or damp weather  Therapies tried and outcome: heat and ice - effective, ibuprofen    MRI done in 2022- showed moderate tear of the supraspinatus tendon  Had injection of the shoulder in December 2022            Objective    /84 (BP Location: Right arm, Patient Position: Chair, Cuff Size: Adult Large)   Pulse 74   Temp 97.4  F (36.3  C) (Oral)   Resp 12   Ht 1.778 m (5' 10\")   Wt 100 kg (220 lb 7.4 oz)   LMP 02/17/2024 (Approximate)   SpO2 99%   BMI 31.63 kg/m    Body mass index is 31.63 kg/m .  Physical Exam   GENERAL: No acute distress  HEENT: Normocephalic  EXTREMITIES: No tenderness over the right AC joint and clavicle, no obvious deformity.  Right upper extremity:  5/5 strength with flexion at the elbow, 5/5 strength with extension the elbow, 5/5 strength with abduction, 5/5 strength with adduction, 5/5 strength " with internal rotation, 5/5 strength with external rotation, 5/5  strength. Pain but no weakness with empty can test.   Left upper extremity: 5/5 strength with flexion at the elbow, 5/5 strength with extension the elbow, 5/5 strength with abduction, 5/5 strength with adduction, 5/5 strength with internal rotation, 5/5 strength with external rotation, 5/5  strength.   NEURO: Alert, non-focal      Results for orders placed or performed in visit on 05/17/24 (from the past 24 hour(s))   HEMOGLOBIN A1C   Result Value Ref Range    Hemoglobin A1C 6.6 (H) 0.0 - 5.6 %           Signed Electronically by: Lore Donnelly PA-C

## 2024-05-18 LAB
ANION GAP SERPL CALCULATED.3IONS-SCNC: 12 MMOL/L (ref 7–15)
BUN SERPL-MCNC: 9.6 MG/DL (ref 6–20)
CALCIUM SERPL-MCNC: 9 MG/DL (ref 8.6–10)
CHLORIDE SERPL-SCNC: 102 MMOL/L (ref 98–107)
CHOLEST SERPL-MCNC: 189 MG/DL
CREAT SERPL-MCNC: 0.66 MG/DL (ref 0.51–0.95)
CREAT UR-MCNC: 95.5 MG/DL
DEPRECATED HCO3 PLAS-SCNC: 26 MMOL/L (ref 22–29)
EGFRCR SERPLBLD CKD-EPI 2021: >90 ML/MIN/1.73M2
FASTING STATUS PATIENT QL REPORTED: YES
FASTING STATUS PATIENT QL REPORTED: YES
GLUCOSE SERPL-MCNC: 138 MG/DL (ref 70–99)
HDLC SERPL-MCNC: 53 MG/DL
LDLC SERPL CALC-MCNC: 97 MG/DL
MICROALBUMIN UR-MCNC: <12 MG/L
MICROALBUMIN/CREAT UR: NORMAL MG/G{CREAT}
NONHDLC SERPL-MCNC: 136 MG/DL
POTASSIUM SERPL-SCNC: 4.9 MMOL/L (ref 3.4–5.3)
SODIUM SERPL-SCNC: 140 MMOL/L (ref 135–145)
TRIGL SERPL-MCNC: 197 MG/DL

## 2024-05-28 NOTE — RESULT ENCOUNTER NOTE
Results mailed to patient per provider request.  Meena Snow CMA ([facility]) on 5/28/2024 at 10:40 AM

## 2024-07-28 ENCOUNTER — HEALTH MAINTENANCE LETTER (OUTPATIENT)
Age: 49
End: 2024-07-28

## 2024-08-08 ENCOUNTER — ANCILLARY PROCEDURE (OUTPATIENT)
Dept: GENERAL RADIOLOGY | Facility: CLINIC | Age: 49
End: 2024-08-08
Attending: FAMILY MEDICINE
Payer: MEDICAID

## 2024-08-08 ENCOUNTER — OFFICE VISIT (OUTPATIENT)
Dept: URGENT CARE | Facility: URGENT CARE | Age: 49
End: 2024-08-08
Payer: MEDICAID

## 2024-08-08 VITALS
OXYGEN SATURATION: 100 % | BODY MASS INDEX: 33.29 KG/M2 | HEART RATE: 64 BPM | DIASTOLIC BLOOD PRESSURE: 73 MMHG | WEIGHT: 232 LBS | TEMPERATURE: 97.3 F | RESPIRATION RATE: 18 BRPM | SYSTOLIC BLOOD PRESSURE: 106 MMHG

## 2024-08-08 DIAGNOSIS — M54.42 BILATERAL LOW BACK PAIN WITH LEFT-SIDED SCIATICA, UNSPECIFIED CHRONICITY: ICD-10-CM

## 2024-08-08 DIAGNOSIS — M25.552 HIP PAIN, LEFT: ICD-10-CM

## 2024-08-08 DIAGNOSIS — M54.42 BILATERAL LOW BACK PAIN WITH LEFT-SIDED SCIATICA, UNSPECIFIED CHRONICITY: Primary | ICD-10-CM

## 2024-08-08 PROCEDURE — 73502 X-RAY EXAM HIP UNI 2-3 VIEWS: CPT | Mod: TC | Performed by: RADIOLOGY

## 2024-08-08 PROCEDURE — 99214 OFFICE O/P EST MOD 30 MIN: CPT | Performed by: FAMILY MEDICINE

## 2024-08-08 PROCEDURE — 72100 X-RAY EXAM L-S SPINE 2/3 VWS: CPT | Mod: TC | Performed by: RADIOLOGY

## 2024-08-08 RX ORDER — METHOCARBAMOL 500 MG/1
500 TABLET, FILM COATED ORAL 4 TIMES DAILY PRN
Qty: 30 TABLET | Refills: 0 | Status: SHIPPED | OUTPATIENT
Start: 2024-08-08

## 2024-08-08 RX ORDER — PREDNISONE 20 MG/1
40 TABLET ORAL DAILY
Qty: 10 TABLET | Refills: 0 | Status: SHIPPED | OUTPATIENT
Start: 2024-08-08 | End: 2024-08-13

## 2024-08-08 NOTE — PATIENT INSTRUCTIONS
Okay to take ibuprofen 200 mg - 4 tablets (800 mg) every 8 hours as needed.  Okay to take tylenol 500 mg - 2 tablets (1000 mg) every 6-8 hours as needed, do not exceed 3000 mg in 24 hours.  Take full course of prednisone burst (do not take ibuprofen while on prednisone)  Okay to take muscle relaxant - robaxin to help with muscle spasm/tightness    Follow up with spine specialist  Follow up with orthopedic for left hip pain

## 2024-08-08 NOTE — PROGRESS NOTES
SUBJECTIVE:  Chief Complaint   Patient presents with    Leg Pain     Sharp pain that starts at left hip that spreads down to foot x1 week, no known injury, had surgery on that leg about 15 years ago     Lilia Rosales is a 49 year old female who presents with a chief complaint of left hip pain and back pain.    Symptoms present for about 1 week, increasing in past 2 days and is painful, unable to sleep.  Has tried taking tylenol and ibuprofen without improvement of symptoms.  Will radiate down leg.    Will have more low back pain with sitting prolong and with position changes.  No loss of bowel or bladder control.  No leg weakness.      Has prior back pain but this is worse.    Has prior left lower leg surgery.    Past Medical History:   Diagnosis Date    Thyroid disease      Current Outpatient Medications   Medication Sig Dispense Refill    metFORMIN (GLUCOPHAGE XR) 500 MG 24 hr tablet Take 1 tablet (500 mg) by mouth 2 times daily (with meals) 180 tablet 1     Social History     Tobacco Use    Smoking status: Never    Smokeless tobacco: Never   Substance Use Topics    Alcohol use: Never       ROS:  Review of systems negative except as stated above.    EXAM:   /73 (BP Location: Right arm, Patient Position: Sitting, Cuff Size: Adult Large)   Pulse 64   Temp 97.3  F (36.3  C) (Temporal)   Resp 18   Wt 105.2 kg (232 lb)   LMP  (LMP Unknown)   SpO2 100%   BMI 33.29 kg/m    GENERAL APPEARANCE: healthy, alert and mild distress  BACK: decrease ROM, tenderness on L3-5 region, ?sacroiliac tenderness  EXTREMITIES: peripheral pulses normal, left hip with decrease ROM, no tenderness on lateral side of hip  PSYCH:alert, affect bright    X-RAY was done - lumbar spine - DJD changes, no acute fracture personally viewed by me    X-RAY was done - left hip - no acute fracture, mild DJD changes      ASSESSMENT/PLAN:  (M54.42) Bilateral low back pain with left-sided sciatica, unspecified chronicity  (primary encounter  diagnosis)  Plan: XR Lumbar Spine 2/3 Views, predniSONE         (DELTASONE) 20 MG tablet, methocarbamol         (ROBAXIN) 500 MG tablet, Spine          Referral            (M25.552) Hip pain, left  Plan: XR Pelvis w Hip Left G/E 2 Views, methocarbamol        (ROBAXIN) 500 MG tablet, Orthopedic          Referral            Reassurance given, reviewed symptomatic treatment with tylenol, ibuprofen, rest, ice/heat.  Discussed DJD changes and due to sciatica symptoms, RX prednisone burst given.  RX robaxin given to help with muscle spasm/tightness in back.  Referred to Spine specialist for back symptoms, reviewed signs and symptoms that would require ER evaluation.  Discussed that hip joint may be due to both back and leg issue, will refer to Orthopedic for further evaluation and treatment.    Will follow up on formal xray report and notify if any abnormalities    Follow up with spine and orthopedic specialist    He Toledo MD  August 8, 2024 1:09 PM

## 2024-08-19 ENCOUNTER — OFFICE VISIT (OUTPATIENT)
Dept: ORTHOPEDICS | Facility: CLINIC | Age: 49
End: 2024-08-19
Payer: MEDICAID

## 2024-08-19 VITALS — BODY MASS INDEX: 33.21 KG/M2 | WEIGHT: 232 LBS | HEIGHT: 70 IN

## 2024-08-19 DIAGNOSIS — M25.552 HIP PAIN, LEFT: ICD-10-CM

## 2024-08-19 DIAGNOSIS — M54.50 LOW BACK PAIN RADIATING TO LEFT LEG: Primary | ICD-10-CM

## 2024-08-19 DIAGNOSIS — M79.605 LOW BACK PAIN RADIATING TO LEFT LEG: Primary | ICD-10-CM

## 2024-08-19 PROCEDURE — 99203 OFFICE O/P NEW LOW 30 MIN: CPT | Performed by: STUDENT IN AN ORGANIZED HEALTH CARE EDUCATION/TRAINING PROGRAM
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
## 2024-08-27 ENCOUNTER — OFFICE VISIT (OUTPATIENT)
Dept: URGENT CARE | Facility: URGENT CARE | Age: 49
End: 2024-08-27
Payer: MEDICAID

## 2024-08-27 VITALS
SYSTOLIC BLOOD PRESSURE: 140 MMHG | HEART RATE: 94 BPM | TEMPERATURE: 101 F | OXYGEN SATURATION: 100 % | DIASTOLIC BLOOD PRESSURE: 77 MMHG | RESPIRATION RATE: 18 BRPM

## 2024-08-27 DIAGNOSIS — R52 GENERALIZED BODY ACHES: ICD-10-CM

## 2024-08-27 DIAGNOSIS — R05.1 ACUTE COUGH: ICD-10-CM

## 2024-08-27 DIAGNOSIS — J02.9 SORE THROAT: ICD-10-CM

## 2024-08-27 DIAGNOSIS — J06.9 VIRAL URI WITH COUGH: Primary | ICD-10-CM

## 2024-08-27 DIAGNOSIS — R09.81 NASAL CONGESTION: ICD-10-CM

## 2024-08-27 DIAGNOSIS — R11.0 NAUSEA: ICD-10-CM

## 2024-08-27 DIAGNOSIS — R50.9 FEVER IN ADULT: ICD-10-CM

## 2024-08-27 LAB
DEPRECATED S PYO AG THROAT QL EIA: NEGATIVE
FLUAV AG SPEC QL IA: NEGATIVE
FLUBV AG SPEC QL IA: NEGATIVE

## 2024-08-27 PROCEDURE — 87651 STREP A DNA AMP PROBE: CPT | Performed by: PHYSICIAN ASSISTANT

## 2024-08-27 PROCEDURE — 87635 SARS-COV-2 COVID-19 AMP PRB: CPT | Performed by: PHYSICIAN ASSISTANT

## 2024-08-27 PROCEDURE — 87804 INFLUENZA ASSAY W/OPTIC: CPT | Performed by: PHYSICIAN ASSISTANT

## 2024-08-27 PROCEDURE — 99213 OFFICE O/P EST LOW 20 MIN: CPT | Performed by: PHYSICIAN ASSISTANT

## 2024-08-27 RX ORDER — ACETAMINOPHEN 500 MG
1000 TABLET ORAL ONCE
Status: COMPLETED | OUTPATIENT
Start: 2024-08-27 | End: 2024-08-27

## 2024-08-27 RX ADMIN — Medication 1000 MG: at 18:05

## 2024-08-27 NOTE — PROGRESS NOTES
ASSESSMENT/PLAN:    (J06.9) Viral URI with cough  (primary encounter diagnosis)    MDM: Acute onset upper respiratory symptoms consistent with viral URI. High medical suspicion for Covid infection. Influenza testing is negative. Rapid Strep is negative. Strep and Covid PCR test results pending. No evidence of secondary bacterial infection on exam. No evidence of respiratory distress or other medical distress requiring emergent intervention at this time.    After Visit Summary/Plan-       August 27, 2024 Urgent Care Plan      -Home supportive care and observant management   -As long as your regular doctor allows you to take Ibuprofen, you can alternate over the counter doses of Ibuprofen 400 mg and Tylenol 650 mg  (switch from one to the other every 4 hours) for the next couple of days, as needed for sore throat, body aches, headaches, and fever.   -Encourage extra fluids   -Follow-up with your primary care provider if you are not all better in the next week and sooner if your symptoms worsen.     -Watch your MyChart for your second Strep Test result (called Strep PCR).  The second test result typically comes back between 4 hours and 24 hours.  If your second test shows Strep Throat, we will contact you and start you on an antibiotic.     -Watch your MyChart for your pending COVID PCR test result (this typically takes 24 to 36 hours).      -If you test positive for COVID, reach out to your primary care team to see if they would like to start you on the medication Paxlovid (which needs to be started within 5 days onset of illness).        (R05.1) Acute cough    (R50.9) Fever in adult  Plan: Symptomatic COVID-19 Virus (Coronavirus) by PCR        Nasopharyngeal, Influenza A & B Antigen,         Streptococcus A Rapid Screen w/Reflex to PCR,         acetaminophen (TYLENOL) tablet 1,000 mg      (J02.9) Sore throat  Plan: acetaminophen (TYLENOL) tablet 1,000 mg      (R52) Generalized body aches  Plan: acetaminophen  (TYLENOL) tablet 1,000 mg    (R11.0) Nausea      (R09.81) Nasal congestion      This progress note has been dictated, with use of voice recognition software. Any grammatical, typographical, or context errors are unintentional and inherent to use of voice recognition software.  ---------------------            SUBJECTIVE:    Lilia Rosales IS A 49 year old female, with a past medical history that includes diabetes, presenting to urgent care today, accompanied by her daughter,  for evaluation of acute onset dry cough, sore throat, nasal congestion, fever, generalized bodyaches, and waxing and waning generalized headache that began yesterday.    Illness Contact: No household or other known close contact illness exposure       RESP HX:  No prior history of hospitalization for respiratory distress. No formal diagnosis of asthma or COPD.                ROS: Positive as per above. Positive for nausea and 2 isolated episodes of non-bloody vomiting. No associated up of blood, blue lips/fingers/toes, or severe shortness of breath (confirms they are still able to to all self cares and activities of daily living). No acute fainting, chest pain, racing or irregular heartbeats. No acute onset abdominal pain. No diarrhea. No severe body aches, severe headaches, rashes, hives, joint swelling or other acute illness symptoms.     Past Medical History:   Diagnosis Date    Thyroid disease        Patient Active Problem List   Diagnosis    Prediabetes    Acquired pes planus    Atypical ductal hyperplasia of breast    Chronic migraine without aura without status migrainosus, not intractable    Female infertility associated with anovulation    Helicobacter pylori gastritis    Iron deficiency anemia    Pityriasis alba    Chronic back pain    Type 2 diabetes mellitus without complication, without long-term current use of insulin (H)       Current Outpatient Medications   Medication Sig Dispense Refill    metFORMIN (GLUCOPHAGE XR) 500 MG 24 hr  tablet Take 1 tablet (500 mg) by mouth 2 times daily (with meals) 180 tablet 1    methocarbamol (ROBAXIN) 500 MG tablet Take 1 tablet (500 mg) by mouth 4 times daily as needed for muscle spasms 30 tablet 0     No current facility-administered medications for this visit.       No Known Allergies      OBJECTIVE:  BP (!) 140/77 (BP Location: Left arm)   Pulse 94   Temp (!) 101  F (38.3  C) (Tympanic)   LMP  (LMP Unknown)   SpO2 100%         General appearance: alert and no apparent distress  Skin color is uniform in color and without rash.  HEENT:   Conjunctiva not injected.  Sclera clear.  Left TM is normal: no effusions, no erythema, and normal landmarks.  Right TM is normal: no effusions, no erythema, and normal landmarks.  Nasal mucosa is Congested  Oropharyngeal exam is normal other than post-nasal drip: no lesions, erythema, adenopathy or exudate.  NECK: Trachea is midline. Neck is supple, FROM with no adenopathy  CARDIAC:NORMAL - regular rate and rhythm without murmur.  RESP: No increased work of breathing at rest--able to speak full sentences without pause. No rhonchi. No wheezing.  No rales. Still moving air well into all listening areas today.   ABDOMEN:  Soft, non-tender, non-distended.  Positive normal bowel sounds.  No HSM or masses.  No suprapubic tenderness.  No CVA tenderness.  NEURO: Alert and oriented.  Normal speech and mentation.  CN II/XII grossly intact.  Gait within normal limits.    LE: No edema

## 2024-08-27 NOTE — PATIENT INSTRUCTIONS
You got a dose of Tylenol 1.000 mg here today at approximately 6 pm. As we discussed, you should read the dosing directions carefully on your Tylenol bottle. Do not take more than 3,000 mg of Tylenol in 24 hours.     August 27, 2024 Urgent Care Plan      -Home supportive care and observant management   -As long as your regular doctor allows you to take Ibuprofen, you can alternate over the counter doses of Ibuprofen 400 mg and Tylenol 650 mg  (switch from one to the other every 4 hours) for the next couple of days, as needed for sore throat, body aches, headaches, and fever.   -Encourage extra fluids   -Follow-up with your primary care provider if you are not all better in the next week and sooner if your symptoms worsen.     -Watch your MyChart for your second Strep Test result (called Strep PCR).  The second test result typically comes back between 4 hours and 24 hours.  If your second test shows Strep Throat, we will contact you and start you on an antibiotic.     -Watch your MyChart for your pending COVID PCR test result (this typically takes 24 to 36 hours).      -If you test positive for COVID, reach out to your primary care team to see if they would like to start you on the medication Paxlovid (which needs to be started within 5 days onset of illness).

## 2024-08-28 LAB
GROUP A STREP BY PCR: NOT DETECTED
SARS-COV-2 RNA RESP QL NAA+PROBE: POSITIVE

## 2024-08-29 ENCOUNTER — TELEPHONE (OUTPATIENT)
Dept: NURSING | Facility: CLINIC | Age: 49
End: 2024-08-29
Payer: MEDICAID

## 2024-08-29 NOTE — TELEPHONE ENCOUNTER
Patient classified as COVID treatment eligible by Epic high risk algorithm:  Yes    Coronavirus (COVID-19) Notification    Reason for call  Notify of POSITIVE COVID-19 lab result, assess symptoms,  review [facility] recommendations    Lab Result   Lab test for 2019-nCoV rRt-PCR or SARS-COV-2 PCR  Oropharyngeal AND/OR nasopharyngeal swabs were POSITIVE for 2019-nCoV RNA [OR] SARS-COV-2 RNA (COVID-19) RNA     We have been unable to reach patient by phone at this time to notify of their Positive COVID-19 result.    Left voicemail message requesting a call back to [phone] [facility] for results.        A Positive COVID-19 letter will be sent via [employer] or the mail.    Zoraida Terrell

## 2024-10-05 ENCOUNTER — HEALTH MAINTENANCE LETTER (OUTPATIENT)
Age: 49
End: 2024-10-05

## 2024-12-23 ENCOUNTER — TRANSFERRED RECORDS (OUTPATIENT)
Dept: HEALTH INFORMATION MANAGEMENT | Facility: CLINIC | Age: 49
End: 2024-12-23
Payer: MEDICAID

## 2025-01-19 ENCOUNTER — HEALTH MAINTENANCE LETTER (OUTPATIENT)
Age: 50
End: 2025-01-19

## 2025-04-27 ENCOUNTER — HEALTH MAINTENANCE LETTER (OUTPATIENT)
Age: 50
End: 2025-04-27

## 2025-06-20 ENCOUNTER — HOSPITAL ENCOUNTER (EMERGENCY)
Facility: CLINIC | Age: 50
Discharge: HOME | End: 2025-06-20
Attending: EMERGENCY MEDICINE | Admitting: EMERGENCY MEDICINE
Payer: MEDICAID

## 2025-06-20 ENCOUNTER — APPOINTMENT (OUTPATIENT)
Dept: CT IMAGING | Facility: CLINIC | Age: 50
End: 2025-06-20
Attending: EMERGENCY MEDICINE
Payer: MEDICAID

## 2025-06-20 ENCOUNTER — APPOINTMENT (OUTPATIENT)
Dept: GENERAL RADIOLOGY | Facility: CLINIC | Age: 50
End: 2025-06-20
Attending: EMERGENCY MEDICINE
Payer: MEDICAID

## 2025-06-20 VITALS
RESPIRATION RATE: 26 BRPM | OXYGEN SATURATION: 99 % | HEART RATE: 83 BPM | SYSTOLIC BLOOD PRESSURE: 129 MMHG | DIASTOLIC BLOOD PRESSURE: 72 MMHG | TEMPERATURE: 98 F

## 2025-06-20 DIAGNOSIS — R07.9 CHEST PAIN, UNSPECIFIED TYPE: ICD-10-CM

## 2025-06-20 DIAGNOSIS — M54.50 ACUTE BILATERAL LOW BACK PAIN, UNSPECIFIED WHETHER SCIATICA PRESENT: ICD-10-CM

## 2025-06-20 LAB
ALBUMIN SERPL BCG-MCNC: 4 G/DL (ref 3.5–5.2)
ALBUMIN UR-MCNC: NEGATIVE MG/DL
ALP SERPL-CCNC: 148 U/L (ref 40–150)
ALT SERPL W P-5'-P-CCNC: 17 U/L (ref 0–50)
ANION GAP SERPL CALCULATED.3IONS-SCNC: 13 MMOL/L (ref 7–15)
APPEARANCE UR: CLEAR
AST SERPL W P-5'-P-CCNC: 15 U/L (ref 0–45)
BASOPHILS # BLD AUTO: 0 10E3/UL (ref 0–0.2)
BASOPHILS NFR BLD AUTO: 0 %
BILIRUB SERPL-MCNC: 0.3 MG/DL
BILIRUB UR QL STRIP: NEGATIVE
BUN SERPL-MCNC: 10.3 MG/DL (ref 6–20)
CALCIUM SERPL-MCNC: 9.1 MG/DL (ref 8.8–10.4)
CHLORIDE SERPL-SCNC: 101 MMOL/L (ref 98–107)
COLOR UR AUTO: YELLOW
CREAT SERPL-MCNC: 0.72 MG/DL (ref 0.51–0.95)
D DIMER PPP FEU-MCNC: 1.57 UG/ML FEU (ref 0–0.5)
EGFRCR SERPLBLD CKD-EPI 2021: >90 ML/MIN/1.73M2
EOSINOPHIL # BLD AUTO: 0.6 10E3/UL (ref 0–0.7)
EOSINOPHIL NFR BLD AUTO: 8 %
ERYTHROCYTE [DISTWIDTH] IN BLOOD BY AUTOMATED COUNT: 13.5 % (ref 10–15)
GLUCOSE SERPL-MCNC: 164 MG/DL (ref 70–99)
GLUCOSE UR STRIP-MCNC: NEGATIVE MG/DL
HCO3 SERPL-SCNC: 23 MMOL/L (ref 22–29)
HCT VFR BLD AUTO: 39.4 % (ref 35–47)
HGB BLD-MCNC: 13.4 G/DL (ref 11.7–15.7)
HGB UR QL STRIP: NEGATIVE
HOLD SPECIMEN: NORMAL
HYALINE CASTS: 3 /LPF
IMM GRANULOCYTES # BLD: 0 10E3/UL
IMM GRANULOCYTES NFR BLD: 0 %
KETONES UR STRIP-MCNC: NEGATIVE MG/DL
LEUKOCYTE ESTERASE UR QL STRIP: ABNORMAL
LYMPHOCYTES # BLD AUTO: 2.6 10E3/UL (ref 0.8–5.3)
LYMPHOCYTES NFR BLD AUTO: 37 %
MCH RBC QN AUTO: 28 PG (ref 26.5–33)
MCHC RBC AUTO-ENTMCNC: 34 G/DL (ref 31.5–36.5)
MCV RBC AUTO: 82 FL (ref 78–100)
MONOCYTES # BLD AUTO: 0.4 10E3/UL (ref 0–1.3)
MONOCYTES NFR BLD AUTO: 5 %
MUCOUS THREADS #/AREA URNS LPF: PRESENT /LPF
NEUTROPHILS # BLD AUTO: 3.3 10E3/UL (ref 1.6–8.3)
NEUTROPHILS NFR BLD AUTO: 48 %
NITRATE UR QL: NEGATIVE
NRBC # BLD AUTO: 0 10E3/UL
NRBC BLD AUTO-RTO: 0 /100
NT-PROBNP SERPL-MCNC: 50 PG/ML (ref 0–192)
PH UR STRIP: 5 [PH] (ref 5–7)
PLATELET # BLD AUTO: 227 10E3/UL (ref 150–450)
POTASSIUM SERPL-SCNC: 4.1 MMOL/L (ref 3.4–5.3)
PROT SERPL-MCNC: 7.9 G/DL (ref 6.4–8.3)
RBC # BLD AUTO: 4.79 10E6/UL (ref 3.8–5.2)
RBC URINE: 2 /HPF
SODIUM SERPL-SCNC: 137 MMOL/L (ref 135–145)
SP GR UR STRIP: 1.03 (ref 1–1.03)
SQUAMOUS EPITHELIAL: 4 /HPF
TROPONIN T SERPL HS-MCNC: <6 NG/L
UROBILINOGEN UR STRIP-MCNC: NORMAL MG/DL
WBC # BLD AUTO: 6.9 10E3/UL (ref 4–11)
WBC URINE: 1 /HPF

## 2025-06-20 PROCEDURE — 81001 URINALYSIS AUTO W/SCOPE: CPT | Performed by: EMERGENCY MEDICINE

## 2025-06-20 PROCEDURE — 250N000009 HC RX 250: Performed by: EMERGENCY MEDICINE

## 2025-06-20 PROCEDURE — 82310 ASSAY OF CALCIUM: CPT | Performed by: EMERGENCY MEDICINE

## 2025-06-20 PROCEDURE — 83880 ASSAY OF NATRIURETIC PEPTIDE: CPT | Performed by: EMERGENCY MEDICINE

## 2025-06-20 PROCEDURE — 71260 CT THORAX DX C+: CPT

## 2025-06-20 PROCEDURE — 71046 X-RAY EXAM CHEST 2 VIEWS: CPT

## 2025-06-20 PROCEDURE — 96375 TX/PRO/DX INJ NEW DRUG ADDON: CPT | Mod: 59 | Performed by: EMERGENCY MEDICINE

## 2025-06-20 PROCEDURE — 84484 ASSAY OF TROPONIN QUANT: CPT | Performed by: EMERGENCY MEDICINE

## 2025-06-20 PROCEDURE — 93005 ELECTROCARDIOGRAM TRACING: CPT | Performed by: EMERGENCY MEDICINE

## 2025-06-20 PROCEDURE — 85379 FIBRIN DEGRADATION QUANT: CPT | Performed by: EMERGENCY MEDICINE

## 2025-06-20 PROCEDURE — 36415 COLL VENOUS BLD VENIPUNCTURE: CPT | Performed by: EMERGENCY MEDICINE

## 2025-06-20 PROCEDURE — 250N000011 HC RX IP 250 OP 636: Performed by: EMERGENCY MEDICINE

## 2025-06-20 PROCEDURE — 85025 COMPLETE CBC W/AUTO DIFF WBC: CPT | Performed by: EMERGENCY MEDICINE

## 2025-06-20 PROCEDURE — 99285 EMERGENCY DEPT VISIT HI MDM: CPT | Mod: 25 | Performed by: EMERGENCY MEDICINE

## 2025-06-20 PROCEDURE — 96374 THER/PROPH/DIAG INJ IV PUSH: CPT | Mod: 59 | Performed by: EMERGENCY MEDICINE

## 2025-06-20 RX ORDER — METHYLPREDNISOLONE 4 MG/1
TABLET ORAL
Qty: 21 TABLET | Refills: 0 | Status: SHIPPED | OUTPATIENT
Start: 2025-06-20

## 2025-06-20 RX ORDER — IOPAMIDOL 755 MG/ML
500 INJECTION, SOLUTION INTRAVASCULAR ONCE
Status: COMPLETED | OUTPATIENT
Start: 2025-06-20 | End: 2025-06-20

## 2025-06-20 RX ORDER — KETOROLAC TROMETHAMINE 15 MG/ML
15 INJECTION, SOLUTION INTRAMUSCULAR; INTRAVENOUS ONCE
Status: COMPLETED | OUTPATIENT
Start: 2025-06-20 | End: 2025-06-20

## 2025-06-20 RX ORDER — MORPHINE SULFATE 4 MG/ML
4 INJECTION, SOLUTION INTRAMUSCULAR; INTRAVENOUS
Refills: 0 | Status: COMPLETED | OUTPATIENT
Start: 2025-06-20 | End: 2025-06-20

## 2025-06-20 RX ADMIN — SODIUM CHLORIDE 60 ML: 9 INJECTION, SOLUTION INTRAVENOUS at 14:56

## 2025-06-20 RX ADMIN — MORPHINE SULFATE 4 MG: 4 INJECTION, SOLUTION INTRAMUSCULAR; INTRAVENOUS at 14:40

## 2025-06-20 RX ADMIN — IOPAMIDOL 72 ML: 755 INJECTION, SOLUTION INTRAVENOUS at 14:56

## 2025-06-20 RX ADMIN — KETOROLAC TROMETHAMINE 15 MG: 15 INJECTION, SOLUTION INTRAMUSCULAR; INTRAVENOUS at 17:02

## 2025-06-20 ASSESSMENT — COLUMBIA-SUICIDE SEVERITY RATING SCALE - C-SSRS
6. HAVE YOU EVER DONE ANYTHING, STARTED TO DO ANYTHING, OR PREPARED TO DO ANYTHING TO END YOUR LIFE?: NO
1. IN THE PAST MONTH, HAVE YOU WISHED YOU WERE DEAD OR WISHED YOU COULD GO TO SLEEP AND NOT WAKE UP?: NO
2. HAVE YOU ACTUALLY HAD ANY THOUGHTS OF KILLING YOURSELF IN THE PAST MONTH?: NO

## 2025-06-20 ASSESSMENT — ACTIVITIES OF DAILY LIVING (ADL)
ADLS_ACUITY_SCORE: 41

## 2025-06-20 NOTE — ED PROVIDER NOTES
Emergency Department Note      History of Present Illness     Chief Complaint   Flank Pain and Chest Pain      HPI   Lilia Rosales is a 50 year old female with a history of type 2 diabetes, GERD, and hypothyroidism, who presents to the emergency department today via EMS for evaluation of chest pain and flank pain. The patient reports she was cooking at home when she suddenly developed a sharp chest pain that then radiated down and into her left lower back. Lilia then fell to the floor and then called EMS. She notes numbness in her legs that developed after her chest pain. Lilia denies any fevers, abdominal pain, history of similar episodes, hypertension, history blood clots, or pain radiating to her legs.     Independent Historian   None    Review of External Notes   None    Past Medical History     Medical History and Problem List   Female infertility associated with anovulation  Allergic conjunctivitis and rhinitis, bilateral  Blepharitis  Hair loss disorder  Midline low back pain without sciatica  Chronic migraine without aura or status migrainosus, not intractable  Environmental allergies  Type 2 diabetes  Pityriasis alba   Atypical ductal hyperplasia of breast  Obesity  Acquired pes planus  GERD  Chronic pain of left knee  Helicobacter pylori gastritis  Vitamin D deficiency  Dyspareunia  Hypothyroidism     Medications   metFORMIN   methocarbamol  methylprednisolone    Surgical History   Breast surgery (left)  Colonoscopy with polypectomy and biopsy    Physical Exam     Patient Vitals for the past 24 hrs:   BP Temp Temp src Pulse Resp SpO2   06/20/25 1706 129/72 -- -- 83 -- 99 %   06/20/25 1445 (!) 141/83 -- -- -- -- 97 %   06/20/25 1415 -- -- -- 100 -- 100 %   06/20/25 1353 (!) 181/78 98  F (36.7  C) Oral 102 26 98 %     Physical Exam  Nursing note and vitals reviewed.  HENT:   Mouth/Throat: Oropharynx is clear and moist.   Eyes: Conjunctivae and EOM are normal. Pupils are equal, round, and reactive to  light.   Cardiovascular: Normal rate, regular rhythm and normal heart sounds.    Pulmonary/Chest: Effort normal and breath sounds normal.   Abdominal: Soft. Bowel sounds are normal.   Musculoskeletal: The patient exhibits no edema.   Lymphadenopathy:    The patient has no cervical adenopathy.   Neurological: The patient is alert.        No facial droop or focal extremity weakness.   Skin: Skin is warm and dry. No rash noted.   Psychiatric: The patient has a normal mood and affect. The patient's behavior is normal.      Diagnostics     Lab Results   Labs Ordered and Resulted from Time of ED Arrival to Time of ED Departure   D DIMER QUANTITATIVE - Abnormal       Result Value    D-Dimer Quantitative 1.57 (*)    COMPREHENSIVE METABOLIC PANEL - Abnormal    Sodium 137      Potassium 4.1      Carbon Dioxide (CO2) 23      Anion Gap 13      Urea Nitrogen 10.3      Creatinine 0.72      GFR Estimate >90      Calcium 9.1      Chloride 101      Glucose 164 (*)     Alkaline Phosphatase 148      AST 15      ALT 17      Protein Total 7.9      Albumin 4.0      Bilirubin Total 0.3     ROUTINE UA WITH MICROSCOPIC REFLEX TO CULTURE - Abnormal    Color Urine Yellow      Appearance Urine Clear      Glucose Urine Negative      Bilirubin Urine Negative      Ketones Urine Negative      Specific Gravity Urine 1.026      Blood Urine Negative      pH Urine 5.0      Protein Albumin Urine Negative      Urobilinogen Urine Normal      Nitrite Urine Negative      Leukocyte Esterase Urine Trace (*)     Mucus Urine Present (*)     RBC Urine 2      WBC Urine 1      Squamous Epithelials Urine 4 (*)     Hyaline Casts Urine 3 (*)    TROPONIN T, HIGH SENSITIVITY - Normal    Troponin T, High Sensitivity <6     NT-PROBNP - Normal    NT-proBNP 50     CBC WITH PLATELETS AND DIFFERENTIAL    WBC Count 6.9      RBC Count 4.79      Hemoglobin 13.4      Hematocrit 39.4      MCV 82      MCH 28.0      MCHC 34.0      RDW 13.5      Platelet Count 227      %  Neutrophils 48      % Lymphocytes 37      % Monocytes 5      % Eosinophils 8      % Basophils 0      % Immature Granulocytes 0      NRBCs per 100 WBC 0      Absolute Neutrophils 3.3      Absolute Lymphocytes 2.6      Absolute Monocytes 0.4      Absolute Eosinophils 0.6      Absolute Basophils 0.0      Absolute Immature Granulocytes 0.0      Absolute NRBCs 0.0         Imaging   CT Aortic Survey w Contrast   Final Result   IMPRESSION:   1.  No evidence for acute aortic injury.   2.  No convincing acute process within the chest, abdomen or pelvis.   3.  Borderline enlarged left greater than right axillary lymph nodes are nonspecific and possibly reactive. Consider attention on follow-up.         XR Chest 2 Views   Final Result   IMPRESSION: No focal airspace opacities, pleural effusion or pneumothorax. The cardiac and mediastinal silhouettes are normal. Metallic foreign object projecting over the left shoulder, may be external to the patient.        EKG   ECG taken at 1359, ECG read at 1405  Normal sinus rhythm   Rate 87 bpm. OK interval 152 ms. QRS duration 74 ms. QT/QTc 346/416 ms. P-R-T axes 55 31 36.    Independent Interpretation   CXR: No pneumothorax, infiltrate, cardiomegaly, or mediastinal widening.    ED Course      Medications Administered   Medications   morphine (PF) injection 4 mg (4 mg Intravenous $Given 6/20/25 1440)   sodium chloride 0.9 % bag for CT scan flush (60 mLs Intravenous $Given 6/20/25 1456)   iopamidol (ISOVUE-370) solution 500 mL (72 mLs Intravenous $Given 6/20/25 1456)   ketorolac (TORADOL) injection 15 mg (15 mg Intravenous $Given 6/20/25 1702)       Procedures   Procedures     Discussion of Management   None    ED Course   ED Course as of 06/20/25 1912 Fri Jun 20, 2025   1350 I obtained history and examined the patient as noted above.       Additional Documentation  None    Medical Decision Making / Diagnosis     CMS Diagnoses: None    MIPS   None    MDM   Lilia Rosales is a 50 year old  female presenting to the emergency department for chest pain and low back pain.  Broad differential was considered including ACS, PE, pneumonia, pneumothorax or aortic dissection.  Given her hypertension and initial tachycardia, there was some higher concern for something like a dissection.  The patient's D-dimer was elevated as well so because of this, CT a of the aorta was ordered.  Fortunately this returned showing no evidence of acute aortic dissection or aortic pathology.  The patient's EKG shows no evidence of ischemia and her troponin is negative making ACS unlikely.  Also on CT there is no evidence of pneumonia, pneumothorax or obvious large pulmonary embolism.  In regards to her back pain, it does sound somewhat radicular though she has no red flag symptoms on her history or physical exam to suggest cauda equina or need for advanced imaging at this time.  She was given 1 dose of morphine and Toradol here with significant improvement of her pain.  Feel at this point that she is stable for discharge home with close outpatient follow-up and she was given strict return precautions which she voiced understanding    Disposition   The patient was discharged.     Diagnosis     ICD-10-CM    1. Chest pain, unspecified type  R07.9       2. Acute bilateral low back pain, unspecified whether sciatica present  M54.50            Discharge Medications   Discharge Medication List as of 6/20/2025  5:41 PM        START taking these medications    Details   methylPREDNISolone (MEDROL DOSEPAK) 4 MG tablet therapy pack Follow Package Directions, Disp-21 tablet, R-0, E-Prescribe           Scribe Disclosure:  Nathan TORRES, am serving as a scribe at 2:03 PM on 6/20/2025 to document services personally performed by Quincy Beavers DO based on my observations and the provider's statements to me.        Quincy Beavers DO  06/20/25 1912

## 2025-06-20 NOTE — DISCHARGE INSTRUCTIONS
Follow-up with your regular doctor in the next several days for reevaluation.  Return to the emergency department at any point for any new or worsening symptoms or for any other concerns.

## 2025-06-20 NOTE — ED TRIAGE NOTES
PT arrives from home via EMS for L flank pain that radiates to her back and chest. 324mg ASA and 100 mcg fentanyl given.            Triage Assessment (Adult)       Row Name 06/20/25 1340          Triage Assessment    Airway WDL WDL        Respiratory WDL    Respiratory WDL WDL        Cardiac WDL    Cardiac WDL WDL        Cognitive/Neuro/Behavioral WDL    Cognitive/Neuro/Behavioral WDL WDL

## 2025-06-23 LAB
ATRIAL RATE - MUSE: 87 BPM
DIASTOLIC BLOOD PRESSURE - MUSE: NORMAL MMHG
INTERPRETATION ECG - MUSE: NORMAL
P AXIS - MUSE: 55 DEGREES
PR INTERVAL - MUSE: 152 MS
QRS DURATION - MUSE: 74 MS
QT - MUSE: 346 MS
QTC - MUSE: 416 MS
R AXIS - MUSE: 31 DEGREES
SYSTOLIC BLOOD PRESSURE - MUSE: NORMAL MMHG
T AXIS - MUSE: 36 DEGREES
VENTRICULAR RATE- MUSE: 87 BPM

## 2025-07-02 ENCOUNTER — HOSPITAL ENCOUNTER (EMERGENCY)
Facility: CLINIC | Age: 50
Discharge: HOME | End: 2025-07-02
Attending: EMERGENCY MEDICINE | Admitting: EMERGENCY MEDICINE
Payer: COMMERCIAL

## 2025-07-02 VITALS
TEMPERATURE: 98.4 F | HEART RATE: 79 BPM | BODY MASS INDEX: 32.2 KG/M2 | HEIGHT: 71 IN | SYSTOLIC BLOOD PRESSURE: 156 MMHG | RESPIRATION RATE: 13 BRPM | OXYGEN SATURATION: 100 % | WEIGHT: 230 LBS | DIASTOLIC BLOOD PRESSURE: 82 MMHG

## 2025-07-02 DIAGNOSIS — M54.9 BACK PAIN, UNSPECIFIED BACK LOCATION, UNSPECIFIED BACK PAIN LATERALITY, UNSPECIFIED CHRONICITY: ICD-10-CM

## 2025-07-02 DIAGNOSIS — R07.9 CHEST PAIN, UNSPECIFIED TYPE: ICD-10-CM

## 2025-07-02 LAB
ALBUMIN SERPL BCG-MCNC: 3.7 G/DL (ref 3.5–5.2)
ALP SERPL-CCNC: 121 U/L (ref 40–150)
ALT SERPL W P-5'-P-CCNC: 12 U/L (ref 0–50)
ANION GAP SERPL CALCULATED.3IONS-SCNC: 13 MMOL/L (ref 7–15)
AST SERPL W P-5'-P-CCNC: 27 U/L (ref 0–45)
ATRIAL RATE - MUSE: 80 BPM
BASOPHILS # BLD AUTO: 0 10E3/UL (ref 0–0.2)
BASOPHILS NFR BLD AUTO: 1 %
BILIRUB SERPL-MCNC: 0.4 MG/DL
BUN SERPL-MCNC: 10.8 MG/DL (ref 6–20)
CALCIUM SERPL-MCNC: 9 MG/DL (ref 8.8–10.4)
CHLORIDE SERPL-SCNC: 101 MMOL/L (ref 98–107)
CREAT SERPL-MCNC: 0.69 MG/DL (ref 0.51–0.95)
DIASTOLIC BLOOD PRESSURE - MUSE: NORMAL MMHG
EGFRCR SERPLBLD CKD-EPI 2021: >90 ML/MIN/1.73M2
EOSINOPHIL # BLD AUTO: 0.6 10E3/UL (ref 0–0.7)
EOSINOPHIL NFR BLD AUTO: 9 %
ERYTHROCYTE [DISTWIDTH] IN BLOOD BY AUTOMATED COUNT: 13.2 % (ref 10–15)
GLUCOSE SERPL-MCNC: 196 MG/DL (ref 70–99)
HCO3 SERPL-SCNC: 22 MMOL/L (ref 22–29)
HCT VFR BLD AUTO: 40.3 % (ref 35–47)
HGB BLD-MCNC: 13.5 G/DL (ref 11.7–15.7)
HOLD SPECIMEN: NORMAL
IMM GRANULOCYTES # BLD: 0 10E3/UL
IMM GRANULOCYTES NFR BLD: 0 %
INTERPRETATION ECG - MUSE: NORMAL
LIPASE SERPL-CCNC: 27 U/L (ref 13–60)
LYMPHOCYTES # BLD AUTO: 0.9 10E3/UL (ref 0.8–5.3)
LYMPHOCYTES NFR BLD AUTO: 16 %
MCH RBC QN AUTO: 28.2 PG (ref 26.5–33)
MCHC RBC AUTO-ENTMCNC: 33.5 G/DL (ref 31.5–36.5)
MCV RBC AUTO: 84 FL (ref 78–100)
MONOCYTES # BLD AUTO: 0.5 10E3/UL (ref 0–1.3)
MONOCYTES NFR BLD AUTO: 8 %
NEUTROPHILS # BLD AUTO: 4 10E3/UL (ref 1.6–8.3)
NEUTROPHILS NFR BLD AUTO: 66 %
NRBC # BLD AUTO: 0 10E3/UL
NRBC BLD AUTO-RTO: 0 /100
P AXIS - MUSE: 52 DEGREES
PLATELET # BLD AUTO: 240 10E3/UL (ref 150–450)
POTASSIUM SERPL-SCNC: 4.7 MMOL/L (ref 3.4–5.3)
PR INTERVAL - MUSE: 166 MS
PROT SERPL-MCNC: 7.5 G/DL (ref 6.4–8.3)
QRS DURATION - MUSE: 76 MS
QT - MUSE: 354 MS
QTC - MUSE: 408 MS
R AXIS - MUSE: 38 DEGREES
RBC # BLD AUTO: 4.79 10E6/UL (ref 3.8–5.2)
SODIUM SERPL-SCNC: 136 MMOL/L (ref 135–145)
SYSTOLIC BLOOD PRESSURE - MUSE: NORMAL MMHG
T AXIS - MUSE: 6 DEGREES
TROPONIN T SERPL HS-MCNC: <6 NG/L
VENTRICULAR RATE- MUSE: 80 BPM
WBC # BLD AUTO: 6 10E3/UL (ref 4–11)

## 2025-07-02 PROCEDURE — 83690 ASSAY OF LIPASE: CPT | Performed by: EMERGENCY MEDICINE

## 2025-07-02 PROCEDURE — 96374 THER/PROPH/DIAG INJ IV PUSH: CPT

## 2025-07-02 PROCEDURE — 99285 EMERGENCY DEPT VISIT HI MDM: CPT | Mod: 25

## 2025-07-02 PROCEDURE — 36415 COLL VENOUS BLD VENIPUNCTURE: CPT | Performed by: EMERGENCY MEDICINE

## 2025-07-02 PROCEDURE — 93005 ELECTROCARDIOGRAM TRACING: CPT

## 2025-07-02 PROCEDURE — 85018 HEMOGLOBIN: CPT | Performed by: EMERGENCY MEDICINE

## 2025-07-02 PROCEDURE — 250N000011 HC RX IP 250 OP 636: Performed by: EMERGENCY MEDICINE

## 2025-07-02 PROCEDURE — 84484 ASSAY OF TROPONIN QUANT: CPT | Performed by: EMERGENCY MEDICINE

## 2025-07-02 PROCEDURE — 80053 COMPREHEN METABOLIC PANEL: CPT | Performed by: EMERGENCY MEDICINE

## 2025-07-02 PROCEDURE — 250N000013 HC RX MED GY IP 250 OP 250 PS 637: Performed by: EMERGENCY MEDICINE

## 2025-07-02 RX ORDER — LIDOCAINE 4 G/G
1 PATCH TOPICAL DAILY PRN
Qty: 10 PATCH | Refills: 0 | Status: SHIPPED | OUTPATIENT
Start: 2025-07-02

## 2025-07-02 RX ORDER — KETOROLAC TROMETHAMINE 15 MG/ML
15 INJECTION, SOLUTION INTRAMUSCULAR; INTRAVENOUS ONCE
Status: COMPLETED | OUTPATIENT
Start: 2025-07-02 | End: 2025-07-02

## 2025-07-02 RX ORDER — LIDOCAINE 4 G/G
1 PATCH TOPICAL ONCE
Status: DISCONTINUED | OUTPATIENT
Start: 2025-07-02 | End: 2025-07-02 | Stop reason: HOSPADM

## 2025-07-02 RX ORDER — ACETAMINOPHEN 500 MG
1000 TABLET ORAL ONCE
Status: COMPLETED | OUTPATIENT
Start: 2025-07-02 | End: 2025-07-02

## 2025-07-02 RX ORDER — CYCLOBENZAPRINE HCL 10 MG
10 TABLET ORAL ONCE
Status: COMPLETED | OUTPATIENT
Start: 2025-07-02 | End: 2025-07-02

## 2025-07-02 RX ORDER — CYCLOBENZAPRINE HCL 10 MG
10 TABLET ORAL 3 TIMES DAILY PRN
Qty: 21 TABLET | Refills: 0 | Status: SHIPPED | OUTPATIENT
Start: 2025-07-02 | End: 2025-07-09

## 2025-07-02 RX ADMIN — CYCLOBENZAPRINE 10 MG: 10 TABLET, FILM COATED ORAL at 10:34

## 2025-07-02 RX ADMIN — KETOROLAC TROMETHAMINE 15 MG: 15 INJECTION, SOLUTION INTRAMUSCULAR; INTRAVENOUS at 08:38

## 2025-07-02 RX ADMIN — ACETAMINOPHEN 1000 MG: 500 TABLET ORAL at 10:34

## 2025-07-02 RX ADMIN — LIDOCAINE 1 PATCH: 4 PATCH TOPICAL at 08:38

## 2025-07-02 ASSESSMENT — COLUMBIA-SUICIDE SEVERITY RATING SCALE - C-SSRS
1. IN THE PAST MONTH, HAVE YOU WISHED YOU WERE DEAD OR WISHED YOU COULD GO TO SLEEP AND NOT WAKE UP?: NO
6. HAVE YOU EVER DONE ANYTHING, STARTED TO DO ANYTHING, OR PREPARED TO DO ANYTHING TO END YOUR LIFE?: NO
2. HAVE YOU ACTUALLY HAD ANY THOUGHTS OF KILLING YOURSELF IN THE PAST MONTH?: NO

## 2025-07-02 ASSESSMENT — ACTIVITIES OF DAILY LIVING (ADL)
ADLS_ACUITY_SCORE: 41

## 2025-07-02 NOTE — ED TRIAGE NOTES
Patient brought in by daughter for complaints of back pain and heavy chest. States pain started this morning. Moving makes pain worse. Complains of SOB. Describes chest discomfort as tightness. Pain in back is located in lower back.     Triage Assessment (Adult)       Row Name 07/02/25 0737          Triage Assessment    Airway WDL WDL        Respiratory WDL    Respiratory WDL WDL        Skin Circulation/Temperature WDL    Skin Circulation/Temperature WDL WDL        Cardiac WDL    Cardiac WDL X;chest pain        Chest Pain Assessment    Chest Pain Location anterior chest, right     Character tightness     Precipitating Factors nothing     Alleviating Factors nothing     Associated Signs/Symptoms dyspnea        Peripheral/Neurovascular WDL    Peripheral Neurovascular WDL WDL        Cognitive/Neuro/Behavioral WDL    Cognitive/Neuro/Behavioral WDL WDL

## 2025-07-02 NOTE — ED NOTES
Pt daughter upset about the wait time and that her mother is not getting a stronger medication. Explained to daughter that writer can only give the pt what the doctor ordered, and that writer did let the provider know they wanted a stronger pain medication. Writer also explained to pt and family about what to expect for wait times when the ER is busy. Daughter remains upset. Wants to speak with provider and tells writer that it was not busy when they got here.

## 2025-07-02 NOTE — ED PROVIDER NOTES
Emergency Department Note      History of Present Illness     Chief Complaint:  Multiple symptoms    HPI   Lilia Rosales is a 50 year old female with a history of chronic back pain as well as migraine headaches who presents emergency department with her daughter for evaluation of multiple symptoms including some chest discomfort that she noticed when she rolled over in bed at 4 AM today, along with ongoing pain in her back, she also has pain in her neck, both legs, feels that there are problems with her kidneys even though she has no urinary symptoms.  No nausea vomiting or fevers.  No pain medication taken today.  No focal numbness or weakness.  No trauma or falls.  No tobacco or alcohol use.  No history of cardiac disease.  Not currently employed.  Lives with family who have been feeling fine.  No history of back surgery.  No history of DVT or PE.  Chest pain is worse with movement and palpation.    Independent Historian: Daughter at bedside who contributes to the history incorporated above.    Review of External Notes: I personally reviewed prior records including CT aorta study done on June 20, this showed normal hepatobiliary system, no evidence of acute aortic pathology or other explanation for her discomfort at that time, labs that day showed a troponin that was undetectably low and a BNP of 50.    Past Medical History     Medical History and Problem List   Past Medical History:   Diagnosis Date    Thyroid disease        Medications   Lidocaine (LIDOCARE) 4 % Patch  metFORMIN (GLUCOPHAGE XR) 500 MG 24 hr tablet  methocarbamol (ROBAXIN) 500 MG tablet      Surgical History   Past Surgical History:   Procedure Laterality Date    BREAST SURGERY Left 08/20/2014    COLONOSCOPY N/A 5/6/2022    Procedure: COLONOSCOPY, WITH POLYPECTOMY AND BIOPSY;  Surgeon: Edwige Norris MD;  Location:  GI     Physical Exam     Patient Vitals for the past 24 hrs:   BP Temp Temp src Pulse Resp SpO2 Height Weight   07/02/25  "0851 -- -- -- 79 13 100 % -- --   07/02/25 0836 -- -- -- 84 28 100 % -- --   07/02/25 0834 -- -- -- 80 25 100 % -- --   07/02/25 0833 -- -- -- 80 15 100 % -- --   07/02/25 0832 -- -- -- 74 18 100 % -- --   07/02/25 0831 -- -- -- 80 17 97 % -- --   07/02/25 0830 (!) 156/82 -- -- 82 26 100 % -- --   07/02/25 0733 127/70 98.4  F (36.9  C) Oral 84 16 99 % 1.803 m (5' 11\") 104.3 kg (230 lb)     Physical Exam  General: Slight uncomfortable but nontoxic-appearing woman recumbent in the gurney in room 19, daughter nearby  HENT: mucous membranes moist  CV: rate as above, regular rhythm, both legs slightly thick but no pitting lower extremity edema, no JVD, palpable symmetric radial pulses, no murmur audible  Resp: normal effort, speaks in full phrases, no stridor, no cough observed  GI: abdomen soft and nontender, slightly protuberant, no guarding, negative Mcfarland's sign  MSK: reproducible tenderness to central chest without crepitus, full range of motion bilateral shoulders, no point tenderness to midline spine, no CVA tenderness, no palpable effusions to large joints in any extremity, no focal tenderness to legs  Full range of motion of neck  Skin: appropriately warm and dry, no erythema or vesicles to chest wall or abdomen  Neuro: alert, clear speech, oriented, no nuchal rigidity,  equal, can lift both legs off bed  Psych: cooperative, slightly anxious    Diagnostics     ECG results from 07/02/25   EKG 12-lead, tracing only     Value    Systolic Blood Pressure     Diastolic Blood Pressure     Ventricular Rate 80    Atrial Rate 80    CT Interval 166    QRS Duration 76        QTc 408    P Axis 52    R AXIS 38    T Axis 6    Interpretation ECG      Sinus rhythm  Right atrial enlargement     Lab Results   Labs Ordered and Resulted from Time of ED Arrival to Time of ED Departure   COMPREHENSIVE METABOLIC PANEL - Abnormal       Result Value    Sodium 136      Potassium 4.7      Carbon Dioxide (CO2) 22      Anion Gap " "13      Urea Nitrogen 10.8      Creatinine 0.69      GFR Estimate >90      Calcium 9.0      Chloride 101      Glucose 196 (*)     Alkaline Phosphatase 121      AST 27      ALT 12      Protein Total 7.5      Albumin 3.7      Bilirubin Total 0.4     TROPONIN T, HIGH SENSITIVITY - Normal    Troponin T, High Sensitivity <6     LIPASE - Normal    Lipase 27     CBC WITH PLATELETS AND DIFFERENTIAL    WBC Count 6.0      RBC Count 4.79      Hemoglobin 13.5      Hematocrit 40.3      MCV 84      MCH 28.2      MCHC 33.5      RDW 13.2      Platelet Count 240      % Neutrophils 66      % Lymphocytes 16      % Monocytes 8      % Eosinophils 9      % Basophils 1      % Immature Granulocytes 0      NRBCs per 100 WBC 0      Absolute Neutrophils 4.0      Absolute Lymphocytes 0.9      Absolute Monocytes 0.5      Absolute Eosinophils 0.6      Absolute Basophils 0.0      Absolute Immature Granulocytes 0.0      Absolute NRBCs 0.0       Imaging   XR Chest 2 Views   Final Result   IMPRESSION: Negative chest.        Independent Interpretation:   I personally reviewed CXR images, I see no major infiltrate.    ED Course      Medications Administered   Medications   ketorolac (TORADOL) injection 15 mg (15 mg Intravenous $Given 7/2/25 0838)   acetaminophen (TYLENOL) tablet 1,000 mg (1,000 mg Oral $Given 7/2/25 1034)   cyclobenzaprine (FLEXERIL) tablet 10 mg (10 mg Oral $Given 7/2/25 1034)     ED Course and Discussion of Management   ED Course as of 07/02/25 1505   Wed Jul 02, 2025   0827 I returned to continue evaluating patient after being pulled out for phone call on another patient.   1102 I rechecked patient, discussed test results.  Patient volunteers early in conversation, \"I'm good now, I can go\", states pain much better.  Daughter at bedside upset about medication, I sat down and discussed in detail.       Additional Documentation  None    MIPS   None             Medical Decision Making / Diagnosis   Medical Decision Making:  Patient " regarding her chest pain, this is very clearly reproducible on exam, and I have a suspect a musculoskeletal etiology.  Furthermore, note that she recently had extensive workup including aortic CT when seen on June 20, no signs of aortic pathology nor of hepatobiliary disease or other concerning findings.  She continues to have an undetectable troponin.  Suspicion is extremely low for pulmonary embolism, given reproducible discomfort there is no indication for CT of the chest or even D-dimer.  Patient agreed with treatment with medication.  Nurse did notified me part way through her emergency department course that patient wished for something stronger, at which point I ordered Flexeril.  Only at the end of her visit did patient's daughter expressed a desire for her to have received oxycodone.  I explained that I would have been, and was still willing, to arrange for her to receive a dose of oxycodone.  However, at that point, patient states that she felt much better, and she wished simply be discharged.  Patient expresses satisfaction with the plan.  Daughter expresses great frustration at the suboptimal communication regarding her pain needs.  Given the chronic nature of her multiple concerns, I did not feel that a prescription for opioids was appropriate, though I was willing to provide a prescription for lidocaine and Flexeril to use as needed for breakthrough pain while recommending use of over-the-counter analgesics as first-line.  Patient does not have acute focal neurologic signs or symptoms or other features to suggest the likelihood of acute cord compression or serious infection such as epidural abscess or meningitis, no indication for emergent advanced imaging.  She is ambulatory.  Close follow-up through primary care as advised.  Return here for crisis in the hour.    Disposition   Discharged    Diagnosis     ICD-10-CM    1. Chest pain, unspecified type  R07.9     highly suspect musculoskeletal      2.  Back pain, unspecified back location, unspecified back pain laterality, unspecified chronicity  M54.9            Discharge Medications   Discharge Medication List as of 7/2/2025 11:22 AM        START taking these medications    Details   cyclobenzaprine (FLEXERIL) 10 MG tablet Take 1 tablet (10 mg) by mouth 3 times daily as needed for muscle spasms., Disp-21 tablet, R-0, E-Prescribe      Lidocaine (LIDOCARE) 4 % Patch Place 1 patch over 12 hours onto the skin daily as needed for moderate pain. To prevent lidocaine toxicity, patient should be patch free for 12 hrs daily.Disp-10 patch, [id number]             7/2/2025   MD Kirstie Bob Jeffrey Alan, MD  07/02/25 0033

## 2025-08-10 ENCOUNTER — HEALTH MAINTENANCE LETTER (OUTPATIENT)
Age: 50
End: 2025-08-10

## 2025-08-28 ENCOUNTER — PATIENT OUTREACH (OUTPATIENT)
Dept: CARE COORDINATION | Facility: CLINIC | Age: 50
End: 2025-08-28
Payer: COMMERCIAL

## (undated) RX ORDER — FENTANYL CITRATE 50 UG/ML
INJECTION, SOLUTION INTRAMUSCULAR; INTRAVENOUS
Status: DISPENSED
Start: 2022-05-06